# Patient Record
Sex: FEMALE | Race: OTHER | Employment: FULL TIME | URBAN - METROPOLITAN AREA
[De-identification: names, ages, dates, MRNs, and addresses within clinical notes are randomized per-mention and may not be internally consistent; named-entity substitution may affect disease eponyms.]

---

## 2020-07-31 PROBLEM — R10.13 DYSPEPSIA: Status: ACTIVE | Noted: 2020-07-31

## 2020-07-31 PROBLEM — K92.89 GAS BLOAT SYNDROME: Status: ACTIVE | Noted: 2020-07-31

## 2020-07-31 PROBLEM — K21.9 GASTROESOPHAGEAL REFLUX DISEASE: Status: ACTIVE | Noted: 2020-07-31

## 2020-08-05 ENCOUNTER — LAB ENCOUNTER (OUTPATIENT)
Dept: LAB | Facility: HOSPITAL | Age: 30
End: 2020-08-05
Attending: INTERNAL MEDICINE
Payer: COMMERCIAL

## 2020-08-05 DIAGNOSIS — Z01.818 PRE-OP TESTING: ICD-10-CM

## 2020-08-06 LAB — SARS-COV-2 RNA RESP QL NAA+PROBE: NOT DETECTED

## 2020-08-08 PROBLEM — R14.0 ABDOMINAL BLOATING: Status: ACTIVE | Noted: 2020-08-08

## 2021-03-25 PROBLEM — D21.9 FIBROIDS: Status: ACTIVE | Noted: 2021-03-25

## 2021-03-25 PROBLEM — Z98.890 HX OF BLEPHAROPLASTY: Status: ACTIVE | Noted: 2021-03-25

## 2022-12-22 ENCOUNTER — OFFICE VISIT (OUTPATIENT)
Dept: FAMILY MEDICINE CLINIC | Facility: CLINIC | Age: 32
End: 2022-12-22
Payer: COMMERCIAL

## 2022-12-22 VITALS
BODY MASS INDEX: 22.16 KG/M2 | RESPIRATION RATE: 18 BRPM | DIASTOLIC BLOOD PRESSURE: 70 MMHG | SYSTOLIC BLOOD PRESSURE: 100 MMHG | HEART RATE: 72 BPM | TEMPERATURE: 98 F | HEIGHT: 65 IN | WEIGHT: 133 LBS

## 2022-12-22 DIAGNOSIS — Z00.00 LABORATORY EXAM ORDERED AS PART OF ROUTINE GENERAL MEDICAL EXAMINATION: ICD-10-CM

## 2022-12-22 DIAGNOSIS — Z00.00 ANNUAL PHYSICAL EXAM: Primary | ICD-10-CM

## 2022-12-22 DIAGNOSIS — Z12.4 SCREENING FOR MALIGNANT NEOPLASM OF CERVIX: ICD-10-CM

## 2022-12-22 LAB
ALBUMIN SERPL-MCNC: 4 G/DL (ref 3.4–5)
ALBUMIN/GLOB SERPL: 1.1 {RATIO} (ref 1–2)
ALP LIVER SERPL-CCNC: 61 U/L
ALT SERPL-CCNC: 20 U/L
ANION GAP SERPL CALC-SCNC: 5 MMOL/L (ref 0–18)
AST SERPL-CCNC: 12 U/L (ref 15–37)
BASOPHILS # BLD AUTO: 0.03 X10(3) UL (ref 0–0.2)
BASOPHILS NFR BLD AUTO: 0.5 %
BILIRUB SERPL-MCNC: 1.1 MG/DL (ref 0.1–2)
BUN BLD-MCNC: 15 MG/DL (ref 7–18)
CALCIUM BLD-MCNC: 9.1 MG/DL (ref 8.5–10.1)
CHLORIDE SERPL-SCNC: 107 MMOL/L (ref 98–112)
CHOLEST SERPL-MCNC: 190 MG/DL (ref ?–200)
CO2 SERPL-SCNC: 26 MMOL/L (ref 21–32)
CREAT BLD-MCNC: 0.64 MG/DL
EOSINOPHIL # BLD AUTO: 0.07 X10(3) UL (ref 0–0.7)
EOSINOPHIL NFR BLD AUTO: 1.2 %
ERYTHROCYTE [DISTWIDTH] IN BLOOD BY AUTOMATED COUNT: 12.4 %
EST. AVERAGE GLUCOSE BLD GHB EST-MCNC: 111 MG/DL (ref 68–126)
FASTING PATIENT LIPID ANSWER: YES
FASTING STATUS PATIENT QL REPORTED: YES
GFR SERPLBLD BASED ON 1.73 SQ M-ARVRAT: 121 ML/MIN/1.73M2 (ref 60–?)
GLOBULIN PLAS-MCNC: 3.7 G/DL (ref 2.8–4.4)
GLUCOSE BLD-MCNC: 96 MG/DL (ref 70–99)
HBA1C MFR BLD: 5.5 % (ref ?–5.7)
HCT VFR BLD AUTO: 38 %
HDLC SERPL-MCNC: 104 MG/DL (ref 40–59)
HGB BLD-MCNC: 12.8 G/DL
IMM GRANULOCYTES # BLD AUTO: 0.02 X10(3) UL (ref 0–1)
IMM GRANULOCYTES NFR BLD: 0.3 %
LDLC SERPL CALC-MCNC: 69 MG/DL (ref ?–100)
LYMPHOCYTES # BLD AUTO: 2.06 X10(3) UL (ref 1–4)
LYMPHOCYTES NFR BLD AUTO: 35 %
MCH RBC QN AUTO: 32.7 PG (ref 26–34)
MCHC RBC AUTO-ENTMCNC: 33.7 G/DL (ref 31–37)
MCV RBC AUTO: 97.2 FL
MONOCYTES # BLD AUTO: 0.25 X10(3) UL (ref 0.1–1)
MONOCYTES NFR BLD AUTO: 4.2 %
NEUTROPHILS # BLD AUTO: 3.46 X10 (3) UL (ref 1.5–7.7)
NEUTROPHILS # BLD AUTO: 3.46 X10(3) UL (ref 1.5–7.7)
NEUTROPHILS NFR BLD AUTO: 58.8 %
NONHDLC SERPL-MCNC: 86 MG/DL (ref ?–130)
OSMOLALITY SERPL CALC.SUM OF ELEC: 287 MOSM/KG (ref 275–295)
PLATELET # BLD AUTO: 250 10(3)UL (ref 150–450)
POTASSIUM SERPL-SCNC: 4.3 MMOL/L (ref 3.5–5.1)
PROT SERPL-MCNC: 7.7 G/DL (ref 6.4–8.2)
RBC # BLD AUTO: 3.91 X10(6)UL
SODIUM SERPL-SCNC: 138 MMOL/L (ref 136–145)
TRIGL SERPL-MCNC: 101 MG/DL (ref 30–149)
TSI SER-ACNC: 1.31 MIU/ML (ref 0.36–3.74)
VIT D+METAB SERPL-MCNC: 19.6 NG/ML (ref 30–100)
VLDLC SERPL CALC-MCNC: 15 MG/DL (ref 0–30)
WBC # BLD AUTO: 5.9 X10(3) UL (ref 4–11)

## 2022-12-22 PROCEDURE — 83036 HEMOGLOBIN GLYCOSYLATED A1C: CPT | Performed by: STUDENT IN AN ORGANIZED HEALTH CARE EDUCATION/TRAINING PROGRAM

## 2022-12-22 PROCEDURE — 3008F BODY MASS INDEX DOCD: CPT | Performed by: STUDENT IN AN ORGANIZED HEALTH CARE EDUCATION/TRAINING PROGRAM

## 2022-12-22 PROCEDURE — 80061 LIPID PANEL: CPT | Performed by: STUDENT IN AN ORGANIZED HEALTH CARE EDUCATION/TRAINING PROGRAM

## 2022-12-22 PROCEDURE — 99385 PREV VISIT NEW AGE 18-39: CPT | Performed by: STUDENT IN AN ORGANIZED HEALTH CARE EDUCATION/TRAINING PROGRAM

## 2022-12-22 PROCEDURE — 3074F SYST BP LT 130 MM HG: CPT | Performed by: STUDENT IN AN ORGANIZED HEALTH CARE EDUCATION/TRAINING PROGRAM

## 2022-12-22 PROCEDURE — 80050 GENERAL HEALTH PANEL: CPT | Performed by: STUDENT IN AN ORGANIZED HEALTH CARE EDUCATION/TRAINING PROGRAM

## 2022-12-22 PROCEDURE — 3078F DIAST BP <80 MM HG: CPT | Performed by: STUDENT IN AN ORGANIZED HEALTH CARE EDUCATION/TRAINING PROGRAM

## 2022-12-22 PROCEDURE — 82306 VITAMIN D 25 HYDROXY: CPT | Performed by: STUDENT IN AN ORGANIZED HEALTH CARE EDUCATION/TRAINING PROGRAM

## 2022-12-22 PROCEDURE — 87624 HPV HI-RISK TYP POOLED RSLT: CPT | Performed by: STUDENT IN AN ORGANIZED HEALTH CARE EDUCATION/TRAINING PROGRAM

## 2022-12-22 RX ORDER — ACETAMINOPHEN 160 MG
2000 TABLET,DISINTEGRATING ORAL DAILY
COMMUNITY

## 2022-12-22 RX ORDER — CHOLECALCIFEROL (VITAMIN D3) 25 MCG
1 TABLET,CHEWABLE ORAL DAILY
COMMUNITY

## 2022-12-23 LAB — HPV I/H RISK 1 DNA SPEC QL NAA+PROBE: NEGATIVE

## 2023-03-25 ENCOUNTER — LAB ENCOUNTER (OUTPATIENT)
Dept: LAB | Age: 33
End: 2023-03-25
Attending: OBSTETRICS & GYNECOLOGY
Payer: COMMERCIAL

## 2023-03-25 DIAGNOSIS — E28.8 OTHER OVARIAN DYSFUNCTION: ICD-10-CM

## 2023-03-25 LAB — PROGEST SERPL-MCNC: 3.67 NG/ML

## 2023-03-25 PROCEDURE — 36415 COLL VENOUS BLD VENIPUNCTURE: CPT

## 2023-03-25 PROCEDURE — 84144 ASSAY OF PROGESTERONE: CPT

## 2023-03-25 PROCEDURE — 84146 ASSAY OF PROLACTIN: CPT | Performed by: OBSTETRICS & GYNECOLOGY

## 2023-04-15 ENCOUNTER — LAB ENCOUNTER (OUTPATIENT)
Dept: LAB | Age: 33
End: 2023-04-15
Attending: OBSTETRICS & GYNECOLOGY
Payer: COMMERCIAL

## 2023-04-15 DIAGNOSIS — E28.8 OTHER OVARIAN DYSFUNCTION: ICD-10-CM

## 2023-04-15 LAB — PROGEST SERPL-MCNC: 26.2 NG/ML

## 2023-04-15 PROCEDURE — 36415 COLL VENOUS BLD VENIPUNCTURE: CPT

## 2023-04-15 PROCEDURE — 84144 ASSAY OF PROGESTERONE: CPT

## 2023-05-24 PROCEDURE — 87491 CHLMYD TRACH DNA AMP PROBE: CPT | Performed by: OBSTETRICS & GYNECOLOGY

## 2023-05-24 PROCEDURE — 87591 N.GONORRHOEAE DNA AMP PROB: CPT | Performed by: OBSTETRICS & GYNECOLOGY

## 2023-05-24 PROCEDURE — 87086 URINE CULTURE/COLONY COUNT: CPT | Performed by: OBSTETRICS & GYNECOLOGY

## 2023-06-20 PROCEDURE — 87491 CHLMYD TRACH DNA AMP PROBE: CPT | Performed by: OBSTETRICS & GYNECOLOGY

## 2023-06-20 PROCEDURE — 87086 URINE CULTURE/COLONY COUNT: CPT | Performed by: OBSTETRICS & GYNECOLOGY

## 2023-06-20 PROCEDURE — 87591 N.GONORRHOEAE DNA AMP PROB: CPT | Performed by: OBSTETRICS & GYNECOLOGY

## 2023-06-27 ENCOUNTER — LAB ENCOUNTER (OUTPATIENT)
Dept: LAB | Age: 33
End: 2023-06-27
Attending: STUDENT IN AN ORGANIZED HEALTH CARE EDUCATION/TRAINING PROGRAM
Payer: COMMERCIAL

## 2023-06-27 DIAGNOSIS — Z36.9 FIRST TRIMESTER SCREENING: ICD-10-CM

## 2023-06-27 DIAGNOSIS — Z3A.12 12 WEEKS GESTATION OF PREGNANCY: ICD-10-CM

## 2023-06-27 DIAGNOSIS — Z34.81 ENCOUNTER FOR SUPERVISION OF OTHER NORMAL PREGNANCY IN FIRST TRIMESTER: ICD-10-CM

## 2023-06-27 DIAGNOSIS — Z3A.08 8 WEEKS GESTATION OF PREGNANCY: ICD-10-CM

## 2023-06-27 LAB
ANTIBODY SCREEN: NEGATIVE
BASOPHILS # BLD AUTO: 0.04 X10(3) UL (ref 0–0.2)
BASOPHILS NFR BLD AUTO: 0.7 %
EOSINOPHIL # BLD AUTO: 0.02 X10(3) UL (ref 0–0.7)
EOSINOPHIL NFR BLD AUTO: 0.4 %
ERYTHROCYTE [DISTWIDTH] IN BLOOD BY AUTOMATED COUNT: 11.9 %
HCT VFR BLD AUTO: 30.9 %
HGB BLD-MCNC: 11 G/DL
IMM GRANULOCYTES # BLD AUTO: 0.03 X10(3) UL (ref 0–1)
IMM GRANULOCYTES NFR BLD: 0.5 %
LYMPHOCYTES # BLD AUTO: 1.47 X10(3) UL (ref 1–4)
LYMPHOCYTES NFR BLD AUTO: 26.1 %
MCH RBC QN AUTO: 33.3 PG (ref 26–34)
MCHC RBC AUTO-ENTMCNC: 35.6 G/DL (ref 31–37)
MCV RBC AUTO: 93.6 FL
MONOCYTES # BLD AUTO: 0.24 X10(3) UL (ref 0.1–1)
MONOCYTES NFR BLD AUTO: 4.3 %
NEUTROPHILS # BLD AUTO: 3.83 X10 (3) UL (ref 1.5–7.7)
NEUTROPHILS # BLD AUTO: 3.83 X10(3) UL (ref 1.5–7.7)
NEUTROPHILS NFR BLD AUTO: 68 %
PLATELET # BLD AUTO: 233 10(3)UL (ref 150–450)
RBC # BLD AUTO: 3.3 X10(6)UL
RH BLOOD TYPE: POSITIVE
RUBV IGG SER QL: POSITIVE
RUBV IGG SER-ACNC: 12.2 IU/ML (ref 10–?)
WBC # BLD AUTO: 5.6 X10(3) UL (ref 4–11)

## 2023-06-27 PROCEDURE — 86803 HEPATITIS C AB TEST: CPT

## 2023-06-27 PROCEDURE — 86900 BLOOD TYPING SEROLOGIC ABO: CPT

## 2023-06-27 PROCEDURE — 86850 RBC ANTIBODY SCREEN: CPT

## 2023-06-27 PROCEDURE — 86780 TREPONEMA PALLIDUM: CPT

## 2023-06-27 PROCEDURE — 85025 COMPLETE CBC W/AUTO DIFF WBC: CPT

## 2023-06-27 PROCEDURE — 87389 HIV-1 AG W/HIV-1&-2 AB AG IA: CPT

## 2023-06-27 PROCEDURE — 87340 HEPATITIS B SURFACE AG IA: CPT

## 2023-06-27 PROCEDURE — 86762 RUBELLA ANTIBODY: CPT

## 2023-06-27 PROCEDURE — 86901 BLOOD TYPING SEROLOGIC RH(D): CPT

## 2023-06-28 LAB
HBV SURFACE AG SER-ACNC: <0.1 [IU]/L
HBV SURFACE AG SERPL QL IA: NONREACTIVE
HCV AB SERPL QL IA: NONREACTIVE
T PALLIDUM AB SER QL IA: NONREACTIVE

## 2023-07-19 ENCOUNTER — LAB ENCOUNTER (OUTPATIENT)
Dept: LAB | Age: 33
End: 2023-07-19
Payer: COMMERCIAL

## 2023-07-19 DIAGNOSIS — Z34.02 ENCOUNTER FOR SUPERVISION OF NORMAL FIRST PREGNANCY IN SECOND TRIMESTER: ICD-10-CM

## 2023-07-19 PROCEDURE — 82105 ALPHA-FETOPROTEIN SERUM: CPT

## 2023-07-19 PROCEDURE — 36415 COLL VENOUS BLD VENIPUNCTURE: CPT

## 2023-07-22 LAB
AFP MOM: 1.84
AFP VALUE: 68.6 NG/ML
GA ON COLL DATE: 16.4 WEEKS
INSULIN DEP AFP: NO
MAT AGE AT EDD: 32.5 YR
MULTIPLE GEST AFP: NO
OSBR RISK 1 IN AFP: 1162
WEIGHT AFP: 144 LBS

## 2023-08-04 ENCOUNTER — TELEPHONE (OUTPATIENT)
Dept: FAMILY MEDICINE CLINIC | Facility: CLINIC | Age: 33
End: 2023-08-04

## 2023-09-30 ENCOUNTER — LAB ENCOUNTER (OUTPATIENT)
Dept: LAB | Age: 33
End: 2023-09-30
Attending: OBSTETRICS & GYNECOLOGY
Payer: COMMERCIAL

## 2023-09-30 DIAGNOSIS — Z34.02 ENCOUNTER FOR SUPERVISION OF NORMAL FIRST PREGNANCY IN SECOND TRIMESTER: ICD-10-CM

## 2023-09-30 LAB
BASOPHILS # BLD AUTO: 0.04 X10(3) UL (ref 0–0.2)
BASOPHILS NFR BLD AUTO: 0.5 %
DEPRECATED HBV CORE AB SER IA-ACNC: 7.3 NG/ML
EOSINOPHIL # BLD AUTO: 0.03 X10(3) UL (ref 0–0.7)
EOSINOPHIL NFR BLD AUTO: 0.4 %
ERYTHROCYTE [DISTWIDTH] IN BLOOD BY AUTOMATED COUNT: 12.6 %
GLUCOSE 1H P GLC SERPL-MCNC: 160 MG/DL
HCT VFR BLD AUTO: 29.3 %
HGB BLD-MCNC: 10.3 G/DL
IMM GRANULOCYTES # BLD AUTO: 0.05 X10(3) UL (ref 0–1)
IMM GRANULOCYTES NFR BLD: 0.6 %
LYMPHOCYTES # BLD AUTO: 1.62 X10(3) UL (ref 1–4)
LYMPHOCYTES NFR BLD AUTO: 19.5 %
MCH RBC QN AUTO: 33 PG (ref 26–34)
MCHC RBC AUTO-ENTMCNC: 35.2 G/DL (ref 31–37)
MCV RBC AUTO: 93.9 FL
MONOCYTES # BLD AUTO: 0.27 X10(3) UL (ref 0.1–1)
MONOCYTES NFR BLD AUTO: 3.3 %
NEUTROPHILS # BLD AUTO: 6.28 X10 (3) UL (ref 1.5–7.7)
NEUTROPHILS # BLD AUTO: 6.28 X10(3) UL (ref 1.5–7.7)
NEUTROPHILS NFR BLD AUTO: 75.7 %
PLATELET # BLD AUTO: 224 10(3)UL (ref 150–450)
RBC # BLD AUTO: 3.12 X10(6)UL
WBC # BLD AUTO: 8.3 X10(3) UL (ref 4–11)

## 2023-09-30 PROCEDURE — 82728 ASSAY OF FERRITIN: CPT

## 2023-09-30 PROCEDURE — 36415 COLL VENOUS BLD VENIPUNCTURE: CPT

## 2023-09-30 PROCEDURE — 82950 GLUCOSE TEST: CPT

## 2023-09-30 PROCEDURE — 85025 COMPLETE CBC W/AUTO DIFF WBC: CPT

## 2023-10-17 PROBLEM — D50.9 IRON DEFICIENCY ANEMIA, UNSPECIFIED: Status: ACTIVE | Noted: 2023-10-17

## 2023-10-17 PROBLEM — Z34.90 PREGNANCY: Status: ACTIVE | Noted: 2023-10-17

## 2023-10-17 PROBLEM — Z34.90 PREGNANCY (HCC): Status: ACTIVE | Noted: 2023-10-17

## 2023-10-18 ENCOUNTER — TELEPHONE (OUTPATIENT)
Dept: HEMATOLOGY/ONCOLOGY | Facility: HOSPITAL | Age: 33
End: 2023-10-18

## 2023-10-18 NOTE — TELEPHONE ENCOUNTER
Received call back from Hira at [facility] office. Dr. Pamela Heart to clarify interval for Venofer orders- will call back today.

## 2023-10-18 NOTE — TELEPHONE ENCOUNTER
Received call back from [facility]. Venofer doses should be once per week. Order entered for MD to co-sign. Pt has been contacted to schedule first dose.

## 2023-10-21 ENCOUNTER — LAB ENCOUNTER (OUTPATIENT)
Dept: LAB | Age: 33
End: 2023-10-21
Attending: OBSTETRICS & GYNECOLOGY

## 2023-10-21 DIAGNOSIS — Z34.03 ENCOUNTER FOR SUPERVISION OF NORMAL FIRST PREGNANCY IN THIRD TRIMESTER: ICD-10-CM

## 2023-10-21 DIAGNOSIS — O99.810 ABNORMAL MATERNAL GLUCOSE TOLERANCE, ANTEPARTUM: ICD-10-CM

## 2023-10-21 LAB
GLUCOSE 1H P GLC SERPL-MCNC: 143 MG/DL
GLUCOSE 2H P GLC SERPL-MCNC: 101 MG/DL
GLUCOSE 3H P GLC SERPL-MCNC: 63 MG/DL (ref 70–140)
GLUCOSE P FAST SERPL-MCNC: 91 MG/DL

## 2023-10-21 PROCEDURE — 82952 GTT-ADDED SAMPLES: CPT

## 2023-10-21 PROCEDURE — 36415 COLL VENOUS BLD VENIPUNCTURE: CPT

## 2023-10-21 PROCEDURE — 82951 GLUCOSE TOLERANCE TEST (GTT): CPT

## 2023-10-21 PROCEDURE — 87389 HIV-1 AG W/HIV-1&-2 AB AG IA: CPT

## 2023-10-27 ENCOUNTER — OFFICE VISIT (OUTPATIENT)
Dept: HEMATOLOGY/ONCOLOGY | Facility: HOSPITAL | Age: 33
End: 2023-10-27
Attending: OBSTETRICS & GYNECOLOGY

## 2023-10-27 VITALS
HEART RATE: 99 BPM | OXYGEN SATURATION: 97 % | SYSTOLIC BLOOD PRESSURE: 110 MMHG | DIASTOLIC BLOOD PRESSURE: 70 MMHG | TEMPERATURE: 98 F | RESPIRATION RATE: 16 BRPM

## 2023-10-27 DIAGNOSIS — D50.9 IRON DEFICIENCY ANEMIA, UNSPECIFIED: Primary | ICD-10-CM

## 2023-10-27 DIAGNOSIS — Z34.90 PREGNANCY: ICD-10-CM

## 2023-10-27 PROCEDURE — 96374 THER/PROPH/DIAG INJ IV PUSH: CPT

## 2023-10-27 RX ORDER — DIPHENHYDRAMINE HYDROCHLORIDE 50 MG/ML
25 INJECTION INTRAMUSCULAR; INTRAVENOUS ONCE
OUTPATIENT
Start: 2023-11-03

## 2023-10-27 RX ORDER — MEPERIDINE HYDROCHLORIDE 25 MG/ML
25 INJECTION INTRAMUSCULAR; INTRAVENOUS; SUBCUTANEOUS ONCE
OUTPATIENT
Start: 2023-11-03

## 2023-10-27 RX ORDER — FAMOTIDINE 10 MG/ML
20 INJECTION, SOLUTION INTRAVENOUS ONCE
OUTPATIENT
Start: 2023-11-03

## 2023-10-27 RX ORDER — METHYLPREDNISOLONE SODIUM SUCCINATE 125 MG/2ML
125 INJECTION, POWDER, LYOPHILIZED, FOR SOLUTION INTRAMUSCULAR; INTRAVENOUS ONCE
OUTPATIENT
Start: 2023-11-03

## 2023-10-27 RX ORDER — METHYLPREDNISOLONE SODIUM SUCCINATE 40 MG/ML
40 INJECTION, POWDER, LYOPHILIZED, FOR SOLUTION INTRAMUSCULAR; INTRAVENOUS ONCE
OUTPATIENT
Start: 2023-11-03

## 2023-10-27 NOTE — PROGRESS NOTES
Education Record    Learner:  Patient    Disease / Diagnosis:iv venofer    Barriers / Limitations:  None   Comments:    Method:  Discussion   Comments:    General Topics:  Medication and Plan of care reviewed   Comments:    Outcome:  Shows understanding   Comments:tolerated venofer. Observed for 30 minutes and vital signs recorded. Next appointment requested.

## 2023-11-02 ENCOUNTER — APPOINTMENT (OUTPATIENT)
Dept: HEMATOLOGY/ONCOLOGY | Age: 33
End: 2023-11-02
Attending: OBSTETRICS & GYNECOLOGY
Payer: COMMERCIAL

## 2023-11-09 ENCOUNTER — OFFICE VISIT (OUTPATIENT)
Dept: HEMATOLOGY/ONCOLOGY | Facility: HOSPITAL | Age: 33
End: 2023-11-09
Attending: OBSTETRICS & GYNECOLOGY
Payer: COMMERCIAL

## 2023-11-09 VITALS
SYSTOLIC BLOOD PRESSURE: 101 MMHG | OXYGEN SATURATION: 96 % | TEMPERATURE: 97 F | BODY MASS INDEX: 25.93 KG/M2 | HEIGHT: 65 IN | DIASTOLIC BLOOD PRESSURE: 64 MMHG | HEART RATE: 96 BPM | WEIGHT: 155.63 LBS

## 2023-11-09 DIAGNOSIS — D50.9 IRON DEFICIENCY ANEMIA, UNSPECIFIED: Primary | ICD-10-CM

## 2023-11-09 DIAGNOSIS — Z34.90 PREGNANCY: ICD-10-CM

## 2023-11-09 PROCEDURE — 96374 THER/PROPH/DIAG INJ IV PUSH: CPT

## 2023-11-09 RX ORDER — FAMOTIDINE 10 MG/ML
20 INJECTION, SOLUTION INTRAVENOUS ONCE
OUTPATIENT
Start: 2023-11-09

## 2023-11-09 RX ORDER — DIPHENHYDRAMINE HYDROCHLORIDE 50 MG/ML
25 INJECTION INTRAMUSCULAR; INTRAVENOUS ONCE
OUTPATIENT
Start: 2023-11-09

## 2023-11-09 RX ORDER — METHYLPREDNISOLONE SODIUM SUCCINATE 125 MG/2ML
125 INJECTION, POWDER, LYOPHILIZED, FOR SOLUTION INTRAMUSCULAR; INTRAVENOUS ONCE
OUTPATIENT
Start: 2023-11-09

## 2023-11-09 RX ORDER — METHYLPREDNISOLONE SODIUM SUCCINATE 40 MG/ML
40 INJECTION, POWDER, LYOPHILIZED, FOR SOLUTION INTRAMUSCULAR; INTRAVENOUS ONCE
OUTPATIENT
Start: 2023-11-09

## 2023-11-09 RX ORDER — MEPERIDINE HYDROCHLORIDE 25 MG/ML
25 INJECTION INTRAMUSCULAR; INTRAVENOUS; SUBCUTANEOUS ONCE
OUTPATIENT
Start: 2023-11-09

## 2023-11-09 NOTE — PROGRESS NOTES
Pt here for venofer infusion . Pt denies any issues or concerns. Ordering MD: David  Order Exp: end of order     Pt tolerated infusion without difficulty or complaint.  Reviewed next apt date/time:       Education Record  Learner:  Patient  Disease / Diagnosis: BARB in pregnancy  Barriers / Limitations:  None  Method:  Brief focused  General Topics:  Plan of care reviewed  Outcome:  Shows understanding      Vitals obtained 30 minutes after infusion

## 2023-12-07 PROCEDURE — 87081 CULTURE SCREEN ONLY: CPT | Performed by: OBSTETRICS & GYNECOLOGY

## 2023-12-07 PROCEDURE — 87150 DNA/RNA AMPLIFIED PROBE: CPT | Performed by: OBSTETRICS & GYNECOLOGY

## 2023-12-25 ENCOUNTER — HOSPITAL ENCOUNTER (INPATIENT)
Facility: HOSPITAL | Age: 33
LOS: 2 days | Discharge: HOME | End: 2023-12-27
Attending: OBSTETRICS & GYNECOLOGY | Admitting: OBSTETRICS & GYNECOLOGY
Payer: COMMERCIAL

## 2023-12-25 ENCOUNTER — ANESTHESIA EVENT (OUTPATIENT)
Dept: OBGYN UNIT | Facility: HOSPITAL | Age: 33
End: 2023-12-25
Payer: COMMERCIAL

## 2023-12-25 ENCOUNTER — ANESTHESIA (OUTPATIENT)
Dept: OBGYN UNIT | Facility: HOSPITAL | Age: 33
End: 2023-12-25
Payer: COMMERCIAL

## 2023-12-25 LAB
ANTIBODY SCREEN: NEGATIVE
BASOPHILS # BLD AUTO: 0.06 X10(3) UL (ref 0–0.2)
BASOPHILS NFR BLD AUTO: 0.5 %
EOSINOPHIL # BLD AUTO: 0.01 X10(3) UL (ref 0–0.7)
EOSINOPHIL NFR BLD AUTO: 0.1 %
ERYTHROCYTE [DISTWIDTH] IN BLOOD BY AUTOMATED COUNT: 14.3 %
HCT VFR BLD AUTO: 32 %
HGB BLD-MCNC: 11.3 G/DL
IMM GRANULOCYTES # BLD AUTO: 0.23 X10(3) UL (ref 0–1)
IMM GRANULOCYTES NFR BLD: 1.8 %
LYMPHOCYTES # BLD AUTO: 1.71 X10(3) UL (ref 1–4)
LYMPHOCYTES NFR BLD AUTO: 13.3 %
MCH RBC QN AUTO: 32.9 PG (ref 26–34)
MCHC RBC AUTO-ENTMCNC: 35.3 G/DL (ref 31–37)
MCV RBC AUTO: 93.3 FL
MONOCYTES # BLD AUTO: 0.62 X10(3) UL (ref 0.1–1)
MONOCYTES NFR BLD AUTO: 4.8 %
NEUTROPHILS # BLD AUTO: 10.2 X10 (3) UL (ref 1.5–7.7)
NEUTROPHILS # BLD AUTO: 10.2 X10(3) UL (ref 1.5–7.7)
NEUTROPHILS NFR BLD AUTO: 79.5 %
PLATELET # BLD AUTO: 164 10(3)UL (ref 150–450)
RBC # BLD AUTO: 3.43 X10(6)UL
RH BLOOD TYPE: POSITIVE
T PALLIDUM AB SER QL IA: NONREACTIVE
WBC # BLD AUTO: 12.8 X10(3) UL (ref 4–11)

## 2023-12-25 PROCEDURE — 0DQR0ZZ REPAIR ANAL SPHINCTER, OPEN APPROACH: ICD-10-PCS | Performed by: OBSTETRICS & GYNECOLOGY

## 2023-12-25 PROCEDURE — 85025 COMPLETE CBC W/AUTO DIFF WBC: CPT | Performed by: OBSTETRICS & GYNECOLOGY

## 2023-12-25 PROCEDURE — 86900 BLOOD TYPING SEROLOGIC ABO: CPT | Performed by: OBSTETRICS & GYNECOLOGY

## 2023-12-25 PROCEDURE — 86850 RBC ANTIBODY SCREEN: CPT | Performed by: OBSTETRICS & GYNECOLOGY

## 2023-12-25 PROCEDURE — 86780 TREPONEMA PALLIDUM: CPT | Performed by: OBSTETRICS & GYNECOLOGY

## 2023-12-25 PROCEDURE — 99214 OFFICE O/P EST MOD 30 MIN: CPT

## 2023-12-25 PROCEDURE — 86901 BLOOD TYPING SEROLOGIC RH(D): CPT | Performed by: OBSTETRICS & GYNECOLOGY

## 2023-12-25 RX ORDER — BISACODYL 10 MG
10 SUPPOSITORY, RECTAL RECTAL ONCE AS NEEDED
Status: DISCONTINUED | OUTPATIENT
Start: 2023-12-25 | End: 2023-12-27

## 2023-12-25 RX ORDER — CITRIC ACID/SODIUM CITRATE 334-500MG
30 SOLUTION, ORAL ORAL AS NEEDED
Status: DISCONTINUED | OUTPATIENT
Start: 2023-12-25 | End: 2023-12-25 | Stop reason: HOSPADM

## 2023-12-25 RX ORDER — TERBUTALINE SULFATE 1 MG/ML
0.25 INJECTION, SOLUTION SUBCUTANEOUS AS NEEDED
Status: DISCONTINUED | OUTPATIENT
Start: 2023-12-25 | End: 2023-12-25 | Stop reason: HOSPADM

## 2023-12-25 RX ORDER — NALBUPHINE HYDROCHLORIDE 10 MG/ML
2.5 INJECTION, SOLUTION INTRAMUSCULAR; INTRAVENOUS; SUBCUTANEOUS
Status: DISCONTINUED | OUTPATIENT
Start: 2023-12-25 | End: 2023-12-26

## 2023-12-25 RX ORDER — DOCUSATE SODIUM 100 MG/1
100 CAPSULE, LIQUID FILLED ORAL
Status: DISCONTINUED | OUTPATIENT
Start: 2023-12-25 | End: 2023-12-27

## 2023-12-25 RX ORDER — IBUPROFEN 600 MG/1
600 TABLET ORAL ONCE AS NEEDED
Status: DISCONTINUED | OUTPATIENT
Start: 2023-12-25 | End: 2023-12-25 | Stop reason: HOSPADM

## 2023-12-25 RX ORDER — METHYLERGONOVINE MALEATE 0.2 MG/ML
INJECTION INTRAVENOUS
Status: COMPLETED
Start: 2023-12-25 | End: 2023-12-25

## 2023-12-25 RX ORDER — DEXTROSE, SODIUM CHLORIDE, SODIUM LACTATE, POTASSIUM CHLORIDE, AND CALCIUM CHLORIDE 5; .6; .31; .03; .02 G/100ML; G/100ML; G/100ML; G/100ML; G/100ML
INJECTION, SOLUTION INTRAVENOUS AS NEEDED
Status: DISCONTINUED | OUTPATIENT
Start: 2023-12-25 | End: 2023-12-25 | Stop reason: HOSPADM

## 2023-12-25 RX ORDER — ACETAMINOPHEN 500 MG
1000 TABLET ORAL EVERY 6 HOURS PRN
Status: DISCONTINUED | OUTPATIENT
Start: 2023-12-25 | End: 2023-12-27

## 2023-12-25 RX ORDER — SODIUM CHLORIDE, SODIUM LACTATE, POTASSIUM CHLORIDE, CALCIUM CHLORIDE 600; 310; 30; 20 MG/100ML; MG/100ML; MG/100ML; MG/100ML
INJECTION, SOLUTION INTRAVENOUS CONTINUOUS
Status: DISCONTINUED | OUTPATIENT
Start: 2023-12-25 | End: 2023-12-25 | Stop reason: HOSPADM

## 2023-12-25 RX ORDER — LIDOCAINE HYDROCHLORIDE AND EPINEPHRINE 15; 5 MG/ML; UG/ML
INJECTION, SOLUTION EPIDURAL AS NEEDED
Status: DISCONTINUED | OUTPATIENT
Start: 2023-12-25 | End: 2023-12-25 | Stop reason: SURG

## 2023-12-25 RX ORDER — ACETAMINOPHEN 500 MG
500 TABLET ORAL EVERY 6 HOURS PRN
Status: DISCONTINUED | OUTPATIENT
Start: 2023-12-25 | End: 2023-12-27

## 2023-12-25 RX ORDER — BUPIVACAINE HCL/0.9 % NACL/PF 0.25 %
5 PLASTIC BAG, INJECTION (ML) EPIDURAL AS NEEDED
Status: DISCONTINUED | OUTPATIENT
Start: 2023-12-25 | End: 2023-12-26

## 2023-12-25 RX ORDER — ACETAMINOPHEN 500 MG
500 TABLET ORAL EVERY 6 HOURS PRN
Status: DISCONTINUED | OUTPATIENT
Start: 2023-12-25 | End: 2023-12-25

## 2023-12-25 RX ORDER — ONDANSETRON 2 MG/ML
4 INJECTION INTRAMUSCULAR; INTRAVENOUS EVERY 6 HOURS PRN
Status: DISCONTINUED | OUTPATIENT
Start: 2023-12-25 | End: 2023-12-25 | Stop reason: HOSPADM

## 2023-12-25 RX ORDER — SIMETHICONE 80 MG
80 TABLET,CHEWABLE ORAL 3 TIMES DAILY PRN
Status: DISCONTINUED | OUTPATIENT
Start: 2023-12-25 | End: 2023-12-27

## 2023-12-25 RX ORDER — IBUPROFEN 600 MG/1
600 TABLET ORAL EVERY 6 HOURS
Status: DISCONTINUED | OUTPATIENT
Start: 2023-12-25 | End: 2023-12-27

## 2023-12-25 RX ORDER — ACETAMINOPHEN 500 MG
1000 TABLET ORAL EVERY 6 HOURS PRN
Status: DISCONTINUED | OUTPATIENT
Start: 2023-12-25 | End: 2023-12-25

## 2023-12-25 RX ADMIN — LIDOCAINE HYDROCHLORIDE AND EPINEPHRINE 3 ML: 15; 5 INJECTION, SOLUTION EPIDURAL at 10:39:00

## 2023-12-25 RX ADMIN — LIDOCAINE HYDROCHLORIDE AND EPINEPHRINE 2 ML: 15; 5 INJECTION, SOLUTION EPIDURAL at 10:40:00

## 2023-12-25 NOTE — ANESTHESIA PROCEDURE NOTES
Labor Analgesia    Date/Time: 12/25/2023 10:29 AM    Performed by: Elver Meraz MD  Authorized by: Elver Meraz MD      General Information and Staff    Start Time:  12/25/2023 10:29 AM  End Time:  12/25/2023 10:39 AM  Anesthesiologist:  Elver Meraz MD  Performed by:   Anesthesiologist  Patient Location:  OB  Site Identification: surface landmarks    Reason for Block: labor epidural    Preanesthetic Checklist: patient identified, IV checked, risks and benefits discussed, monitors and equipment checked, pre-op evaluation, timeout performed, IV bolus, anesthesia consent and sterile technique used      Procedure Details    Patient Position:  Sitting  Prep: ChloraPrep    Monitoring:  Heart rate and continuous pulse ox  Approach:  Midline    Epidural Needle    Injection Technique:  MARYANN air  Needle Type:  Tuohy  Needle Gauge:  17 G  Needle Length:  3.375 in  Needle Insertion Depth:  6  Location:  L4-5    Spinal Needle      Catheter    Catheter Type:  End hole  Catheter Size:  19 G  Catheter at Skin Depth:  12  Test Dose:  Negative    Assessment      Additional Comments     Test Dose Given at 1039 am  Fentanyl 2 mc/ml + Ropivicaine 0.15% epidural infusion 12cc/hr  Fentanyl 50 mcg/mL 100 mcg

## 2023-12-25 NOTE — PLAN OF CARE
Problem: BIRTH - VAGINAL/ SECTION  Goal: Fetal and maternal status remain reassuring during the birth process  Description: INTERVENTIONS:  - Monitor vital signs  - Monitor fetal heart rate  - Monitor uterine activity  - Monitor labor progression (vaginal delivery)  - DVT prophylaxis (C/S delivery)  - Surgical antibiotic prophylaxis (C/S delivery)  Outcome: Progressing     Problem: PAIN - ADULT  Goal: Verbalizes/displays adequate comfort level or patient's stated pain goal  Description: INTERVENTIONS:  - Encourage pt to monitor pain and request assistance  - Assess pain using appropriate pain scale  - Administer analgesics based on type and severity of pain and evaluate response  - Implement non-pharmacological measures as appropriate and evaluate response  - Consider cultural and social influences on pain and pain management  - Manage/alleviate anxiety  - Utilize distraction and/or relaxation techniques  - Monitor for opioid side effects  - Notify MD/LIP if interventions unsuccessful or patient reports new pain  - Anticipate increased pain with activity and pre-medicate as appropriate  Outcome: Progressing     Problem: ANXIETY  Goal: Will report anxiety at manageable levels  Description: INTERVENTIONS:  - Administer medication as ordered  - Teach and rehearse alternative coping skills  - Provide emotional support with 1:1 interaction with staff  Outcome: Progressing     Problem: Patient/Family Goals  Goal: Patient/Family Long Term Goal  Description: Patient's Long Term Goal: Safe and uncomplicated delivery    Interventions:  - See additional Care Plan goals for specific interventions  Outcome: Progressing     Problem: Patient/Family Goals  Goal: Patient/Family Short Term Goal  Description: Patient's Short Term Goal: Adequate pain control    Interventions:   - See additional Care Plan goals for specific interventions  Outcome: Progressing

## 2023-12-25 NOTE — PROGRESS NOTES
Pt is a 29year old female admitted to 104/104-A,     Pt is 39w1d intra-uterine pregnancy. Patient states UC's began to get stronger and more intense at 0100. Denies any leaking of fluid. Reports +fetal movement. History obtained, consents signed. Oriented to room, staff, and plan of care.

## 2023-12-26 LAB
BASOPHILS # BLD AUTO: 0.04 X10(3) UL (ref 0–0.2)
BASOPHILS NFR BLD AUTO: 0.3 %
DEPRECATED HBV CORE AB SER IA-ACNC: 26.6 NG/ML
EOSINOPHIL # BLD AUTO: 0.03 X10(3) UL (ref 0–0.7)
EOSINOPHIL NFR BLD AUTO: 0.2 %
ERYTHROCYTE [DISTWIDTH] IN BLOOD BY AUTOMATED COUNT: 14.5 %
HCT VFR BLD AUTO: 28.3 %
HGB BLD-MCNC: 9.5 G/DL
IMM GRANULOCYTES # BLD AUTO: 0.11 X10(3) UL (ref 0–1)
IMM GRANULOCYTES NFR BLD: 0.7 %
LYMPHOCYTES # BLD AUTO: 1.83 X10(3) UL (ref 1–4)
LYMPHOCYTES NFR BLD AUTO: 11.5 %
MCH RBC QN AUTO: 31.8 PG (ref 26–34)
MCHC RBC AUTO-ENTMCNC: 33.6 G/DL (ref 31–37)
MCV RBC AUTO: 94.6 FL
MONOCYTES # BLD AUTO: 0.92 X10(3) UL (ref 0.1–1)
MONOCYTES NFR BLD AUTO: 5.8 %
NEUTROPHILS # BLD AUTO: 12.94 X10 (3) UL (ref 1.5–7.7)
NEUTROPHILS # BLD AUTO: 12.94 X10(3) UL (ref 1.5–7.7)
NEUTROPHILS NFR BLD AUTO: 81.5 %
PLATELET # BLD AUTO: 127 10(3)UL (ref 150–450)
RBC # BLD AUTO: 2.99 X10(6)UL
WBC # BLD AUTO: 15.9 X10(3) UL (ref 4–11)

## 2023-12-26 PROCEDURE — 85025 COMPLETE CBC W/AUTO DIFF WBC: CPT | Performed by: OBSTETRICS & GYNECOLOGY

## 2023-12-26 PROCEDURE — 82728 ASSAY OF FERRITIN: CPT | Performed by: OBSTETRICS & GYNECOLOGY

## 2023-12-26 RX ORDER — CHOLECALCIFEROL (VITAMIN D3) 25 MCG
1 TABLET,CHEWABLE ORAL DAILY
Status: DISCONTINUED | OUTPATIENT
Start: 2023-12-26 | End: 2023-12-27

## 2023-12-26 NOTE — PROGRESS NOTES
Patient up to bathroom with assist x 2. Voided 500 mL/U. Patient transferred to mother/baby room 2206 per wheelchair in stable condition with baby and personal belongings. Accompanied by significant other and staff. Bands matched with Both parents and RN. Report given to mother/baby RN Dwight Kim.

## 2023-12-26 NOTE — L&D DELIVERY NOTE
Nahomi Marshall [[id number]]      Labor Events     labor?: No   steroids?: None  Antibiotics received during labor?: No  Rupture date/time: 2023 1109     Rupture type: SROM  Fluid color: Clear  Labor type: Spontaneous Onset of Labor  Augmentation: None  Intrapartum & labor complications: None       Labor Event Times    Labor onset date/time: 2023 0100  Dilation complete date/time: 2023  Start pushing date/time: 2023 1933       [place] Presentation    Presentation: Vertex  Position: Right Occiput Anterior       Operative Delivery    Operative Vaginal Delivery: No                      Shoulder Dystocia    Shoulder Dystocia: No             Anesthesia    Method: Epidural              [place] Delivery      Delivery date/time:  23 20:50:00   Delivery type: Normal spontaneous vaginal delivery    Details:     Delivery location: delivery room       Delivery Providers    Delivering Clinician: Cedric Gonzalez MD   Delivery personnel:  Provider Role   Althea Cedeño, RN Baby Nurse   Binh Ko, RN Delivery Nurse             Cord    Vessels: 3 Vessels  Complications: None  Timed cord clamping: Yes  Gases sent?: No       Resuscitation    Method: None       [place] Measurements      Weight: 4090 g 9 lb 0.3 oz Length: 54.6 cm     Head circum.: 33 cm Chest circum. : 34 cm          Abdominal circum.: 32 cm           Placenta    Date/time: 2023  Removal: Spontaneous  Appearance: Intact  Disposition: held for future pathology       Apgars    Living status: Living   Apgar Scoring Key:    0 1 2    Skin color Blue or pale Acrocyanotic Completely pink    Heart rate Absent <100 bpm >100 bpm    Reflex irritability No response Grimace Cry or active withdrawal    Muscle tone Limp Some flexion Active motion    Respiratory effort Absent Weak cry; hypoventilation Good, crying              1 Minute:  5 Minute:  10 Minute:  15 Minute:  20 Minute:      Skin color: 0  1 Heart rate: 2  2       Reflex irritablity: 2  2       Muscle tone: 2  2       Respiratory effort: 2  2       Total: 8  9          Apgars assigned by: Kota Schulte RN  Elsah disposition: with mother       Skin to Skin    Skin to skin with: Mother       Vaginal Count    Initial count RN: Petr Terrell RN  Initial count Tech: Marsha Jeffers    Initial counts 11   0    Final counts        Final count RN: Krish Camarillo RN  Final count MD: Karime Joseph MD       Delivery (Maternal)    Episiotomy: None  Perineal lacerations: 3rd Repaired?: Yes     Vaginal laceration?: No      Cervical laceration?: No    Clitoral laceration?: No                                                                          Vaginal Delivery Note          Narciso Loving Patient Status:  Inpatient    1990 MRN [id number]   Location [address] [place] [address] Attending Oma Nichols MD   Hosp Day # 0 PCP Noelle Overton MD       Delivery: Normal spontaneous vaginal delivery  Surgeon: Evan Cardozo  Anesthesia: Epidural  EBL: 600 cc  Infant: male  Apgars: 8/9  Weight: 9#  Procedure: The patients was placed in the dorsalithotomy position and prepped. She was encouraged to push. As the head was delivered the legs were lowered and the perineum was supported to decrease the risk of tearing. The shoulders and body were delivered atraumatically with momentum. The infant was dried and suctioned. The cord was doubly clamped and cut at 1 min. The cord blood was sampled. IV pitocin and gentle uterine massage helped delivery of the placenta. IM methergine given for atony. The 3rd degree perineal laceration was repaired in layers. The vaginal portion was approximated with a 2.0 rapide vicryl and a crown stitch was made. The perineum was approximated with the same suture. The skin was approximated with 3.0 rapide vicryl.   A rectal-vaginal exam was normal and bleeding was minimal.  The patient was then moved to the supine position in stable condition. Counts were correct.     Nuchal cord: no  Cord Blood Banking: no  Episiotomy: no  Complications:  None      Nisreen Louise MD  12/25/2023  9:08 PM

## 2023-12-27 VITALS
OXYGEN SATURATION: 96 % | WEIGHT: 166 LBS | HEIGHT: 65 IN | BODY MASS INDEX: 27.66 KG/M2 | SYSTOLIC BLOOD PRESSURE: 101 MMHG | HEART RATE: 78 BPM | DIASTOLIC BLOOD PRESSURE: 76 MMHG | RESPIRATION RATE: 16 BRPM | TEMPERATURE: 98 F

## 2023-12-27 RX ORDER — IBUPROFEN 600 MG/1
600 TABLET ORAL EVERY 6 HOURS PRN
Qty: 30 TABLET | Refills: 1 | Status: SHIPPED | OUTPATIENT
Start: 2023-12-27

## 2023-12-27 NOTE — PROGRESS NOTES
DISCHARGE NOTE    Patient discharged to home. Discharge instructions were given. All questions answered.

## 2023-12-27 NOTE — DISCHARGE SUMMARY
[facility]  Discharge Summary    Sofiya Anne Patient Status:  Inpatient    1990 MRN [id number]   [facility] 2SW-J Attending Jalen Lion MD   Hosp Day # 2 PCP Felicia Mccarty MD     Admit Date:  2023    EDC: Estimated Date of Delivery: 23    Gestational Age: 36w3d    Antepartum complications: See Prenatal Record    Date of Delivery: See Delivery Summary    Delivered By:  See Delivery Summary    Delivery Type: spontaneous vaginal delivery       Intrapartum Complications: See Delivery Summary    Episiotomy / lacerations: See Delivery Summary      Rh Immune Globulin Given:  See Prenatal Record and nursing notes    Rubella Vaccine Given: See Prenatal Record and nursing notes    Activity:  Routine post partum and post operative instructions if  section    Medications:  Motrin alternating with Tylenol,     Diet:  General    Discharge Date: 2023          Plan:       Follow-up appointment in 6  weeks  Routine post partum instructions    German Hoffman MD  2023  10:40 AM

## 2023-12-27 NOTE — PLAN OF CARE
Problem: POSTPARTUM  Goal: Long Term Goal:Experiences normal postpartum course  Description: INTERVENTIONS:  - Assess and monitor vital signs and lab values. - Assess fundus and lochia. - Provide ice/sitz baths for perineum discomfort. - Monitor healing of incision/episiotomy/laceration, and assess for signs and symptoms of infection and hematoma. - Assess bladder function and monitor for bladder distention.  - Provide/instruct/assist with pericare as needed. - Provide VTE prophylaxis as needed. - Monitor bowel function.  - Encourage ambulation and provide assistance as needed. - Assess and monitor emotional status and provide social service/psych resources as needed. - Utilize standard precautions and use personal protective equipment as indicated. Ensure aseptic care of all intravenous lines and invasive tubes/drains.  - Obtain immunization and exposure to communicable diseases history. 12/27/2023 1041 by Emily Mccain RN  Outcome: Completed  12/27/2023 0803 by Emily Mccain RN  Outcome: Progressing  Goal: Optimize infant feeding at the breast  Description: INTERVENTIONS:  - Initiate breast feeding within first hour after birth. - Monitor effectiveness of current breast feeding efforts. - Assess support systems available to mother/family.  - Identify cultural beliefs/practices regarding lactation, letdown techniques, maternal food preferences. - Assess mother's knowledge and previous experience with breast feeding.  - Provide information as needed about early infant feeding cues (e.g., rooting, lip smacking, sucking fingers/hand) versus late cue of crying.  - Discuss/demonstrate breast feeding aids (e.g., infant sling, nursing footstool/pillows, and breast pumps). - Encourage mother/other family members to express feelings/concerns, and actively listen. - Educate father/SO about benefits of breast feeding and how to manage common lactation challenges.   - Recommend avoidance of specific medications or substances incompatible with breast feeding.  - Assess and monitor for signs of nipple pain/trauma. - Instruct and provide assistance with proper latch. - Review techniques for milk expression (breast pumping) and storage of breast milk. Provide pumping equipment/supplies, instructions and assistance, as needed. - Encourage rooming-in and breast feeding on demand.  - Encourage skin-to-skin contact. - Provide  support as needed. - Assess for and manage engorgement. - Provide breast feeding education handouts and information on community breast feeding support. 2023 104 by Kavitha Mora RN  Outcome: Completed  2023 by Kavitha Mora RN  Outcome: Progressing  Goal: Appropriate maternal -  bonding  Description: INTERVENTIONS:  - Assess caregiver- interactions. - Assess caregiver's emotional status and coping mechanisms. - Encourage caregiver to participate in  daily care. - Assess support systems available to mother/family.  - Provide /case management support as needed.   2023 104 by Kavitha Mora RN  Outcome: Completed  2023 by Kavitha Mora RN  Outcome: Progressing

## 2023-12-30 ENCOUNTER — TELEPHONE (OUTPATIENT)
Dept: OBGYN UNIT | Facility: HOSPITAL | Age: 33
End: 2023-12-30

## 2024-08-19 ENCOUNTER — APPOINTMENT (OUTPATIENT)
Dept: CT IMAGING | Age: 34
End: 2024-08-19
Attending: EMERGENCY MEDICINE
Payer: COMMERCIAL

## 2024-08-19 ENCOUNTER — HOSPITAL ENCOUNTER (OUTPATIENT)
Age: 34
Discharge: HOME | End: 2024-08-19
Attending: EMERGENCY MEDICINE
Payer: COMMERCIAL

## 2024-08-19 VITALS
OXYGEN SATURATION: 100 % | HEIGHT: 64 IN | HEART RATE: 69 BPM | RESPIRATION RATE: 16 BRPM | SYSTOLIC BLOOD PRESSURE: 101 MMHG | DIASTOLIC BLOOD PRESSURE: 70 MMHG | WEIGHT: 136 LBS | TEMPERATURE: 99 F | BODY MASS INDEX: 23.22 KG/M2

## 2024-08-19 DIAGNOSIS — R55 SYNCOPE, VASOVAGAL: Primary | ICD-10-CM

## 2024-08-19 LAB
#MXD IC: 0.2 X10ˆ3/UL (ref 0.1–1)
ATRIAL RATE: 66 BPM
BUN BLD-MCNC: 19 MG/DL (ref 7–18)
CHLORIDE BLD-SCNC: 105 MMOL/L (ref 98–112)
CO2 BLD-SCNC: 24 MMOL/L (ref 21–32)
CREAT BLD-MCNC: 0.6 MG/DL
EGFRCR SERPLBLD CKD-EPI 2021: 121 ML/MIN/1.73M2 (ref 60–?)
GLUCOSE BLD-MCNC: 97 MG/DL (ref 70–99)
HCT VFR BLD AUTO: 37.9 %
HCT VFR BLD CALC: 37 %
HGB BLD-MCNC: 12.4 G/DL
ISTAT IONIZED CALCIUM FOR CHEM 8: 1.17 MMOL/L (ref 1.12–1.32)
LYMPHOCYTES # BLD AUTO: 1.7 X10ˆ3/UL (ref 1–4)
LYMPHOCYTES NFR BLD AUTO: 33.2 %
MCH RBC QN AUTO: 30.2 PG (ref 26–34)
MCHC RBC AUTO-ENTMCNC: 32.7 G/DL (ref 31–37)
MCV RBC AUTO: 92.2 FL (ref 80–100)
MIXED CELL %: 3.8 %
NEUTROPHILS # BLD AUTO: 3.1 X10ˆ3/UL (ref 1.5–7.7)
NEUTROPHILS NFR BLD AUTO: 63 %
P AXIS: 49 DEGREES
P-R INTERVAL: 136 MS
PLATELET # BLD AUTO: 249 X10ˆ3/UL (ref 150–450)
POTASSIUM BLD-SCNC: 4 MMOL/L (ref 3.6–5.1)
Q-T INTERVAL: 396 MS
QRS DURATION: 74 MS
QTC CALCULATION (BEZET): 415 MS
R AXIS: 50 DEGREES
RBC # BLD AUTO: 4.11 X10ˆ6/UL
SODIUM BLD-SCNC: 141 MMOL/L (ref 136–145)
T AXIS: 26 DEGREES
VENTRICULAR RATE: 66 BPM
WBC # BLD AUTO: 5 X10ˆ3/UL (ref 4–11)

## 2024-08-19 PROCEDURE — 70450 CT HEAD/BRAIN W/O DYE: CPT | Performed by: EMERGENCY MEDICINE

## 2024-08-19 PROCEDURE — 93010 ELECTROCARDIOGRAM REPORT: CPT

## 2024-08-19 PROCEDURE — 99205 OFFICE O/P NEW HI 60 MIN: CPT

## 2024-08-19 PROCEDURE — 85025 COMPLETE CBC W/AUTO DIFF WBC: CPT | Performed by: EMERGENCY MEDICINE

## 2024-08-19 PROCEDURE — 36415 COLL VENOUS BLD VENIPUNCTURE: CPT

## 2024-08-19 PROCEDURE — 93005 ELECTROCARDIOGRAM TRACING: CPT

## 2024-08-19 PROCEDURE — 80047 BASIC METABLC PNL IONIZED CA: CPT

## 2024-08-19 PROCEDURE — 99215 OFFICE O/P EST HI 40 MIN: CPT

## 2024-08-19 NOTE — ED INITIAL ASSESSMENT (HPI)
Patient reports she took 2 gulps of her 's energy drink, and then had a pain to her abdomen.  Patient reports pain felt like a cramp.   states patient arched her back and appeared to have a seizure.

## 2024-08-21 NOTE — ED PROVIDER NOTES
Patient Seen in: [facility]      History   No chief complaint on file.    Stated Complaint: Seizure, Head/Neck Injury    Subjective:   HPI    33 yo female was getting ready for work this morning and two two large gulps of her husbands energy drink. She developed sudden, severe pain in her abdomen and suddenly felt lightheaded. She laid down but then sat up. Her  then reports that she fell backwards and was unresponsive for a few seconds and was shaking. She awoke quickly and was somewhat dazed. In IC all symptoms have resolved and she has no complaints.     Objective:   Past Medical History:    Bloating    Decorative tattoo    Flatulence/gas pain/belching    Food intolerance              History reviewed. No pertinent surgical history.             Social History     Socioeconomic History    Marital status: Single   Tobacco Use    Smoking status: Never     Passive exposure: Never    Smokeless tobacco: Never   Substance and Sexual Activity    Alcohol use: Yes     Comment: [facility]    Drug use: Never    Sexual activity: Yes     Partners: Male     Social Determinants of Health     Financial Resource Strain: Low Risk  (12/25/2023)    Financial Resource Strain     Difficulty of Paying Living Expenses: Not very hard     Med Affordability: No   Food Insecurity: No Food Insecurity (12/25/2023)    Food Insecurity     Food Insecurity: Never true   Transportation Needs: No Transportation Needs (12/25/2023)    Transportation Needs     Lack of Transportation: No   Stress: No Stress Concern Present (12/25/2023)    Stress     Feeling of Stress : No   Housing Stability: Low Risk  (12/25/2023)    Housing Stability     Housing Instability: No              Review of Systems    Positive for stated Chief Complaint: No chief complaint on file.    Other systems are as noted in HPI.  Constitutional and vital signs reviewed.      All other systems reviewed and negative except as noted above.    Physical Exam     ED  Chief Complaint   Patient presents with   • Pain     top of right foot pain, thinks he hit is a while back, wants x-rays       SUBJECTIVE  Presents with self in room.    Ab is a 58 year old male who presents with a lump to his dorsal right foot which he noticed it 3 days.  He feels it could have been there longer as he recalls feeling a smaller bump in same spot while putting on his socks. This bump does not hurt.  He wonders if this is injury as he bumps his foot a lot on ladders. He feels it is a rough job. 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  It does not hurt him but he wants to rule out injury.      Previous injury to this area: none  Things tried for relief:  Nothing taken for relief    Bruising: no  Swelling: yes localized  Paresethesia:  no  Abrasions: no    Denies pain in right foot/ ankle    ROS:    Diabetes:  no  Neuro:   negative   Fevers:  negative     PMH:  None    Alg:  ALLERGIES:  Patient has no known allergies.    Meds:  None    Social Hx:  nonsmoker    OBJECTIVE:  Visit Vitals  /80   Temp 98 °F (36.7 °C) (Tympanic)   Resp 18     Gen: alert and oriented, Nontoxic in appearance and no distress  Lungs: clear to auscultation bilaterally  CV:    regular rate rhythm, no murmurs    **right foot EXAM--  Exam of area of main complaint is dorsal foot. On the dorsal mid foot there is a nickel size dome type circular smooth mobile lesion--likely a ganglionic cyst-- located at mid 2nd/3rd MC.   There is no redness to skin; lesion is not tender;  No open wounds; no warmth;  Full ROM of toes with no pains.  No bruising.   Neurovascular intact at site and distally at pt's baseline.     Right ankle EXAM:   no  swelling noted. no  pain with palpation in area.  Full ROM.    No calf pain or swelling; negative christopher sign ; Achilles intact.         Workup done in [facility]--  Xray of  RIGHT FOOT : no fracture   Nonspecific soft tissue swelling along the dorsal aspect of the midfoot.   If further evaluation is  Triage Vitals [08/19/24 0910]   /70   Pulse 69   Resp 16   Temp 99.1 °F (37.3 °C)   Temp src Oral   SpO2 100 %   O2 Device None (Room air)       Current Vitals:   No data recorded        Physical Exam  Vitals and nursing note reviewed.   Constitutional:       Appearance: Normal appearance. She is well-developed.   HENT:      Head: Normocephalic and atraumatic.      Mouth/Throat:      Mouth: Mucous membranes are moist.      Pharynx: Oropharynx is clear.   Eyes:      Extraocular Movements: Extraocular movements intact.      Pupils: Pupils are equal, round, and reactive to light.   Cardiovascular:      Rate and Rhythm: Normal rate and regular rhythm.      Heart sounds: Normal heart sounds.   Pulmonary:      Effort: Pulmonary effort is normal. No respiratory distress.      Breath sounds: Normal breath sounds.   Abdominal:      General: There is no distension.      Palpations: Abdomen is soft.      Tenderness: There is no abdominal tenderness.   Musculoskeletal:      Cervical back: Normal range of motion and neck supple. No tenderness.   Skin:     General: Skin is warm and dry.      Capillary Refill: Capillary refill takes less than 2 seconds.   Neurological:      General: No focal deficit present.      Mental Status: She is alert.      Cranial Nerves: No cranial nerve deficit.      Sensory: No sensory deficit.      Motor: No weakness.   Psychiatric:         Mood and Affect: Mood normal.         Behavior: Behavior normal.              ED Course     Labs Reviewed   POCT ISTAT CHEM8 CARTRIDGE - Abnormal; Notable for the following components:       Result Value    ISTAT BUN 19 (*)     All other components within normal limits   POCT CBC     EKG    Rate, intervals and axes as noted on EKG Report.  Rate: 66  Rhythm: Sinus Rhythm  Reading: Normal                           MDM                                      Medical Decision Making    Vasovagal syncope, seizure both in differential. History most consistent with  clinically warranted, MRI examination could be  considered.  Osteoarthritis and other findings as described above.    ASSESSMENT:  Soft tissue lump to dorsal right foot--- possible ganglionic cyst     PLAN:   ---discussed this lesion ---appears cystic but further evaluation warranted through podiatry for opinion and if needs further workup such as MRI etc.    -Can use OTC tylenol  if tolerated for mild/ moderate pain.     --do not irritate. If onset of redness at site then concerns of infection.   Then should seek medical care.    --SEE PODIATRY FOR FURTHER EVALUATION OF THIS LESION/ IMAGING ETC..    Questions addressed.  Patient  expresses understanding of treatment plan and follow up instructions.      Reji Guerrier,   03/20/21             vasovagal episode. Patient normal exam in IC and has no complaints. EKG reviewed by myself and is normal. CT brain images reviewed by myself. No acute intracranial bleed. CBC and Chem both normal.   Home to follow up with primary care.   Disposition and Plan     Clinical Impression:  1. Syncope, vasovagal         Disposition:  Discharge  8/19/2024 10:20 am    Follow-up:  James Torres MD  [address] [address]  [phone]      As needed          Medications Prescribed:  Discharge Medication List as of 8/19/2024 10:28 AM

## 2025-02-08 ENCOUNTER — OFFICE VISIT (OUTPATIENT)
Facility: CLINIC | Age: 35
End: 2025-02-08
Payer: COMMERCIAL

## 2025-02-08 VITALS
WEIGHT: 133 LBS | HEIGHT: 65 IN | SYSTOLIC BLOOD PRESSURE: 92 MMHG | BODY MASS INDEX: 22.16 KG/M2 | DIASTOLIC BLOOD PRESSURE: 64 MMHG

## 2025-02-08 DIAGNOSIS — Z31.69 ENCOUNTER FOR PRECONCEPTION CONSULTATION: ICD-10-CM

## 2025-02-08 DIAGNOSIS — Z12.4 CERVICAL CANCER SCREENING: Primary | ICD-10-CM

## 2025-02-08 DIAGNOSIS — Z00.00 ANNUAL PHYSICAL EXAM: ICD-10-CM

## 2025-02-08 DIAGNOSIS — E28.8 OTHER OVARIAN DYSFUNCTION: ICD-10-CM

## 2025-02-08 DIAGNOSIS — N39.3 SUI (STRESS URINARY INCONTINENCE, FEMALE): ICD-10-CM

## 2025-02-08 LAB
BILIRUBIN: NEGATIVE
GLUCOSE (URINE DIPSTICK): NEGATIVE MG/DL
KETONES (URINE DIPSTICK): NEGATIVE MG/DL
LEUKOCYTES: NEGATIVE
NITRITE, URINE: NEGATIVE
OCCULT BLOOD: NEGATIVE
PH, URINE: 6 (ref 4.5–8)
SPECIFIC GRAVITY: 1.02 (ref 1–1.03)
UROBILINOGEN,SEMI-QN: 0.2 MG/DL (ref 0–1.9)

## 2025-02-08 PROCEDURE — 99385 PREV VISIT NEW AGE 18-39: CPT | Performed by: OBSTETRICS & GYNECOLOGY

## 2025-02-08 PROCEDURE — 3008F BODY MASS INDEX DOCD: CPT | Performed by: OBSTETRICS & GYNECOLOGY

## 2025-02-08 PROCEDURE — 88175 CYTOPATH C/V AUTO FLUID REDO: CPT | Performed by: OBSTETRICS & GYNECOLOGY

## 2025-02-08 PROCEDURE — 87624 HPV HI-RISK TYP POOLED RSLT: CPT | Performed by: OBSTETRICS & GYNECOLOGY

## 2025-02-08 PROCEDURE — 81003 URINALYSIS AUTO W/O SCOPE: CPT | Performed by: OBSTETRICS & GYNECOLOGY

## 2025-02-08 PROCEDURE — 3074F SYST BP LT 130 MM HG: CPT | Performed by: OBSTETRICS & GYNECOLOGY

## 2025-02-08 PROCEDURE — 3078F DIAST BP <80 MM HG: CPT | Performed by: OBSTETRICS & GYNECOLOGY

## 2025-02-08 RX ORDER — LETROZOLE 2.5 MG/1
2.5 TABLET, FILM COATED ORAL DAILY
Qty: 15 TABLET | Refills: 1 | Status: SHIPPED | OUTPATIENT
Start: 2025-02-08

## 2025-02-08 NOTE — PROGRESS NOTES
GYN H&P     Genetic questionnaire reviewed with the patient and she will be referred for genetic counseling if the questionnaire had any positive results.    The [facility] intake form was also reviewed regarding contraception, menstrual periods, urinary health, and vaginal / sexual health    2025  8:33 AM    Chief Complaint   Patient presents with    Physical     Would like to talk about pelvic therapy due to leaking when coughing and sneezing.       HPI: Umu is a 34 year old  Patient's last menstrual period was 2025.  (contraception: none) here for her annual gyn exam.     She has JOANIE  complaints.   Menses are regular. Denies any pelvic or breast complaints.   Satisfied with current contraception.     Doesn't wear pad everyday - leaks rarely with stress    Previous encounters and chart reviewed.     OB History    Para Term  AB Living   1 1 1 0 0 1   SAB IAB Ectopic Multiple Live Births   0 0 0 0 1      # Outcome Date GA Lbr Mj/2nd Weight Sex Type Anes PTL Lv   1 Term 23 39w1d 18:15 / 01:35 9 lb 0.3 oz (4.09 kg) M NORMAL SPONT EPI N BEA      Complications: Meconium       GYN hx:   Menarche: 14  Period Cycle (Days): 28  Period Duration (Days): 6  Period Flow: heavy days 1-4  Use of Birth Control (if yes, specify type): None  Pap Date: 22  Pap Result Notes: NEG/NEG,  and neg per Pt.  Follow Up Recommendation:       Past Medical History:    Bloating    Decorative tattoo    Flatulence/gas pain/belching    Food intolerance     No past surgical history on file.  Allergies[1]  Medications Ordered Prior to Encounter[2]  Family History   Problem Relation Age of Onset    Diabetes Maternal Grandmother     Diabetes Maternal Grandfather     Diabetes Paternal Grandmother     Diabetes Paternal Grandfather      Social History     Socioeconomic History    Marital status: Single     Spouse name: Not on file    Number of children: Not on file    Years of education:  Not on file    Highest education level: Not on file   Occupational History    Not on file   Tobacco Use    Smoking status: Never     Passive exposure: Never    Smokeless tobacco: Never   Vaping Use    Vaping status: Not on file   Substance and Sexual Activity    Alcohol use: Yes     Comment: [facility]    Drug use: Never    Sexual activity: Yes     Partners: Male   Other Topics Concern    Not on file   Social History Narrative    Not on file     Social Drivers of Health     Food Insecurity: No Food Insecurity (12/25/2023)    Food Insecurity     Food Insecurity: Never true   Transportation Needs: No Transportation Needs (12/25/2023)    Transportation Needs     Lack of Transportation: No   Stress: No Stress Concern Present (12/25/2023)    Stress     Feeling of Stress : No   Housing Stability: Low Risk  (12/25/2023)    Housing Stability     Housing Instability: No     Housing Instability Emergency: Not on file     Crib or Bassinette: Not on file       ROS:     Review of Systems:  General: denies fevers, chills, fatigue and malaise.   Eyes: no visual changes, denies headaches  ENT: no complaints, denies earaches, runny nose, epistaxis, throat pain or sore throat  Respiratory: denies SOB, dyspnea, cough or wheezing  Cardiovascular: denies chest pain, palpitations, exercise intolerance   GI: denies abdominal pain, diarrhea, constipation  : SEE HPI, denies dysuria, increased urinary frequency. Menses regular, no dysmenorrhea, no menorrhagia, no dyspareunia   Hematological/lymphatic: denies history of excessive bleeding or bruising, denies dizziness, lightheadedness.   Breast: denies rashes, skin changes, pain, lumps or discharge   Psychiatric: denies depression, changes in sleep patterns, anxiety  Endocrine: denies hot or cold intolerance, mood changes   Neurological: denies changes in sight, smell, hearing or taste. Denies seizures or tremors  Immunological: denies allergies, denies anaphylaxis, or swollen lymph  nodes  Musculoskeletal: denies joint pain, morning stiffness, decreased range of motion         O BP 92/64   Ht 65\"   Wt 133 lb (60.3 kg)   LMP 01/16/2025   Breastfeeding Yes   BMI 22.13 kg/m²         Wt Readings from Last 6 Encounters:   02/08/25 133 lb (60.3 kg)   08/19/24 136 lb (61.7 kg)   02/06/24 142 lb (64.4 kg)   12/25/23 166 lb (75.3 kg)   12/20/23 166 lb (75.3 kg)   12/14/23 167 lb (75.8 kg)     Exam:   GENERAL: well developed, well nourished, in no apparent distress, oriented.  SKIN: no rashes, no suspicious lesions  HEENT: normal  NECK: supple; no thyromegaly, no adenopathy  LUNGS: clear to auscultation  CARDIOVASCULAR: normal S1, S2, RRR  BREASTS: FCsoft, nontender, no palpable masses or nodes, no nipple discharge, no skin changes, no axillary adenopathy  ABDOMEN: no scars,  soft, non distended; non tender, no masses  PELVIC: External Genitalia: Normal appearing, no lesions.    Vagina: normal pink mucosa, no lesions, normal clear discharge.    Bladder well supported.  Urethral hypermobility only,     Cervix: multiparous, no lesions , No CMT     Uterus: AVAF, mobile, non tender, normal size    Adnexa: non tender, no masses, normal size    Rectal: deferred  EXTREMITIES:  non tender without edema        A/P: Patient is 34 year old female with no complaints. Here for well woman exam.            Patient counseled on:    Diet/exercise.      Self Breast Exams     Safe sex practices / and living environment     Vaccines:  Annual Flu, Tdap +/- Gardasil  recommended (up to 45 yrs).      Pneumococcal at 65 yrs old, Shingles at 60 yrs old          Pap: done  GC/Chlamydia:  na  Mammogram:  na   Dexa:  na  Colonoscopy / Cologuard:   na  Lipid / Cholesterol:    Lipid Panel [E] [[id number]] (Abnormal)  Resulted: 12/22/22 1134, Result status: Final result  Resulting lab: [facility] ([facility])     Specimen Information    ID Type Source Collected On   [id number] Blood -- 12/22/22 0841      Components    Component Value Reference Range Flag   Cholesterol, Total 190 <200 mg/dL --   Comment: Desirable  <200 mg/dL  Borderline  200-239 mg/dL  High      >=240 mg/dL     HDL Cholesterol 104 40 - 59 mg/dL H High    Comment:      Interpretive Information:  An HDL cholesterol <40 mg/dL is low and constitutes a coronary heart disease risk factor. An HDL cholesterol >60 mg/dL is a negative risk factor for coronary heart disease.     Triglycerides 101 30 - 149 mg/dL --   Comment:      Reference interval for fasting triglycerides  Desirable: <150 mg/dL  Borderline: 150-199 mg/dL  High: 200-499 mg/dL  Very High: >=500 mg/dL       LDL Cholesterol 69 <100 mg/dL --   Comment: Desirable <100 mg/dL  Borderline 100-129 mg/dL  High     >=130mg/dL     VLDL 15 0 - 30 mg/dL --   Non HDL Chol 86 <130 mg/dL --   Comment:      Desirable  <130 mg/dL  Borderline  130-159 mg/dL  High        160-189 mg/dL      Very high >=190 mg/dL            Meds This Visit:    Requested Prescriptions     Signed Prescriptions Disp Refills    letrozole 2.5 MG Oral Tab 15 tablet 1     Sig: Take 1 tablet (2.5 mg total) by mouth daily. Days 3 - 7 of every other month       1. Cervical cancer screening  - ThinPrep PAP Smear; Future  - Hpv High Risk , Thin Prep Collection; Future    2. Annual physical exam    3. Encounter for preconception consultation    4. Lactating mother (HCC)    5. JOANIE (stress urinary incontinence, female)  - Pelvic Floor Therapy - [place] [facility]    6. Other ovarian dysfunction  - letrozole 2.5 MG Oral Tab; Take 1 tablet (2.5 mg total) by mouth daily. Days 3 - 7 of every other month  Dispense: 15 tablet; Refill: 1  - Progesterone; Future  AFter lactation finished      Wants to try and conceive again and needed Letrozole in the past - STILL BREASTFEEDING - counseled    Need to stop - Letrozole q O mth  Luteal phase progesterones  Pelvic floor PT for JOANIE, UA      Return in about 6 months (around 8/8/2025) for Well Woman  Exam.    Jass Hunt MD   2/8/2025  8:33 AM       This note was created by freee voice recognition. Errors in content may be related to improper recognition by the system; efforts to review and correct have been done but errors may still exist. Please contact me with any questions.    Note to patient and family   The 21st Century Cures Act makes medical notes available to patients in the interest of transparency.  However, please be advised that this is a medical document.  It is intended as [id number]-[id number] communication.  It is written in medical language which may contain abbreviations or verbiage that are technical and unfamiliar.  It may appear blunt or direct.  Medical documents are intended to carry relevant information, facts as evident, and the clinical opinion of the practitioner.           [1] No Known Allergies  [2]   Current Outpatient Medications on File Prior to Visit   Medication Sig Dispense Refill    prenatal vitamin with DHA 27-0.8-228 MG Oral Cap Take 1 capsule by mouth daily. (Patient not taking: Reported on 2/8/2025)       No current facility-administered medications on file prior to visit.

## 2025-02-10 LAB — HPV E6+E7 MRNA CVX QL NAA+PROBE: NEGATIVE

## 2025-02-19 ENCOUNTER — TELEPHONE (OUTPATIENT)
Dept: PHYSICAL THERAPY | Facility: HOSPITAL | Age: 35
End: 2025-02-19

## 2025-03-11 ENCOUNTER — TELEPHONE (OUTPATIENT)
Dept: PHYSICAL THERAPY | Facility: HOSPITAL | Age: 35
End: 2025-03-11

## 2025-03-12 ENCOUNTER — OFFICE VISIT (OUTPATIENT)
Dept: PHYSICAL THERAPY | Age: 35
End: 2025-03-12
Attending: OBSTETRICS & GYNECOLOGY
Payer: COMMERCIAL

## 2025-03-12 DIAGNOSIS — N39.3 SUI (STRESS URINARY INCONTINENCE, FEMALE): Primary | ICD-10-CM

## 2025-03-12 PROCEDURE — 97162 PT EVAL MOD COMPLEX 30 MIN: CPT | Performed by: PHYSICAL THERAPIST

## 2025-03-12 PROCEDURE — 97535 SELF CARE MNGMENT TRAINING: CPT | Performed by: PHYSICAL THERAPIST

## 2025-03-12 NOTE — PROGRESS NOTES
MUSCULOSKELETAL AND PELVIC FLOOR EVALUATION:     Diagnosis:   JOANIE (stress urinary incontinence, female) (N39.3) Patient:  Umu Stuart (34 year old, female)        Referring Provider: Jass Hunt  Today's Date   3/12/2025    Precautions:  None   Date of Evaluation: 25  Next MD visit: No data recorded  Date of Surgery: n/a     PATIENT SUMMARY   Summary of chief complaints: JOANIE with cough/ sneeze, UUI, LBP & tightness, abdominal weakness  History of current condition: symptoms after birth of 1st child (boy) on 23; baby was 9#; pt had perineal lac gr 3   Pain level: no pelvic pain other than with intercourse  Description of symptoms: BM 2x/ day up to every 2 days; BSS 4; 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); nocturia 1 only if later fluid intake; water 32-48 oz/ day  Occupation:    Prior level of function: minimal JOANIE prior to pregnancy, increased since delivery  Current limitations: urinary leakage with cough/ sneeze, urinary leakage with full bladder/ urge, dyspareunia with initial & deep penetration, constipation, straining with defecation, fecal smearing, decreased void frequencies, 1x nocturia  Pt goals: \"not to leak\"  Pregnant Now: No (pt is aware to inform PT if she obtains +pregnancy test; pt is open to second child/ another pregnancy)   OB History    Para Term  AB Living   1 1 1 0 0 1   SAB IAB Ectopic Multiple Live Births   0 0 0 0 1      # Outcome Date GA Lbr Jm/2nd Weight Sex Type Anes PTL Lv   1 Term 23 39w1d 18:15 / 01:35 9 lb 0.3 oz M NORMAL SPONT EPI N BEA      Complications: Meconium      Name: Chris Stuart      Apgar1: 8  Apgar5: 9     PFDI 20    Scores   POPDI 6:    25   CRAD 8:    46.88   FRANCESCA 6:    54.17   Summary:    126.05      Outpatient Therapy Pelvic Health Intake       Question 3/11/2025 11:29 PM CDT - Filed by Patient    Do you usually experience pressure in the lower  abdomen? Not at all    Do you usually experience heaviness or dullness in the pelvic area? Not at all    Do you usually have a bulge or something falling out that you can see or feel in your vaginal area? Not at all    Do you ever have to push on the vagina or around the rectum to have or complete a bowel movement? Not at all    Do you usually experience a feeling of incomplete bladder emptying? Not at all    Do you ever have to push up on a bulge in the vaginal area with your fingers to start or complete urination? Not at all    Please provide details on the following urinary history / complaints     Frequency of urination: # of times/day 4    Frequency of urination: # of times/night 3    When you have the urge to urinate, how long can you delay before you have to go to the toilet? (# of minutes or hours) It depends but after having my baby in can’t hold it like I use too . I gsve to goo right away and end up peeing a little.    Do you have a history of urinary tract infections: No    Do you have a history of urine loss (select all that apply)       How many times a day does leakage occur? 1    If you have leakage, what type(s) of protective device(s) do you use? (Select all that apply)       On average, how many pads do you use each day?       Do you soak the pad fully? No    Do you change the pad each time it is wet? No    Do you experience any of these symptoms? (Select all that apply)       Do you usually experience frequent urination? Somewhat    Do you usually experience urine leakage associated with a feeling of urgency, that is, a strong sensation of needing to go to the bathroom? Moderately    Do you usually experience urine leakage related to coughing, sneezing, or laughing? Moderately    Do you usually experience small amounts of urine leakage (that is, drops)? Moderately    Do you usually experience difficulty empyting your bladder? Not at all    Do you usually experience pain or discomfort in the lower  abdomen or genital region? Not at all    Have you ever had any of the following treatment:     Have you ever done exercises to control urine loss? If so, for how long? No    Has your doctor ever prescribed any medication for urine loss? No    Have you had any surgical procedures to treat urine loss? No    Please provide details on the following bowel history / complaints.     How many bowel movements do you have per day? 1    How many bowel movements do you have per night? Between 0-1 it dependa what I eat    Do you experience diarrhea? If yes, how often?       Do you feel you need to strain too hard to have a bowel movement? Moderately    Do you feel you have not completely emptied your bowels at the end of a bowel movement? Somewhat    Do you usually lose stool beyond your control if your stool is well formed? Not at all    Do you usually lose stool beyond your control if your stool is loose? Not at all    Do you usually lose gas from the rectum beyond your control? Moderately    Do you usually have pain when you pass your stool? Somewhat    Do you experience strong sense of urgency and have to rush to bathroom for bowel movement? Not at all    Does part of bowel ever pass through rectum and bulge outside during/after bowel movement? Somewhat    Please provide details on your daily fluid/food intake.     On average, what is your daily fluid intake (1 glass=8ounces)? (# of glasses per day) 4    How many are caffeinated? (# of glasses per day) 0    Do you restrict fluids because of incontenence (leakage)? No    Do you include fiber in your diet (fruits, vegetables, bran, etc.)? Yes    Psychosocial information     Do you live alone? No    Which recreational activities do you participate in if any?       Have you had to restrict your activities due to your pelvic health concerns? No    On a scale of 0 (no impairments) to 10 (severe impairments), what are your feelings about your urinary or bowel incontinence or pelvic  pain? 8    Do you have pain with intercourse? Yes    Have you had any changes in intimate relationships/sexual function due to your symptoms?  Yes    Your personal safety is of utmost importance to us at [facility], please answer the following questions:     Are you being hurt, frightened, demeaned, or taken advantage of by anyone at your home or in your life?  No    Have you recently had thoughts of hurting yourself? No    Have you tried to hurt yourself in the past?  No    Pelvic Organ Prolapse Distress Inventory 6 Score (range: 0 - 100) 25    Colorectal-Anal Distress Inventory 8 Score (range: 0 - 100) 46.88    Urinary Distress Inventory 6 Score (range: 0 - 100) 54.17    PFDI Summary Score (range: 0 - 300) 126.05            Sexual Health Status  Marinoff Scale: 1   Sexual intercourse status: active, pain with both initial & deep penetration, position dependent     Past medical history was reviewed with Umu.  Umu  has a past medical history of Bloating, Decorative tattoo, Flatulence/gas pain/belching, and Food intolerance.  She  has no past surgical history on file.    ASSESSMENT  Umu presents to physical therapy evaluation with primary c/o JOANIE with cough/ sneeze, UUI, LBP & tightness, abdominal weakness. The results of the objective tests and measures show internal PFM exam impairments of: absent involuntary contraction & involuntary relaxation, mod restriction & TTP to perineal scar, PERF 2/4/5/6 indicating decreased power/ endurance/ reps/ quick contractions of PFM contraction, min ant & post wall tissue laxity, min restriction & varied TTP to layer 1-2 PFM L & min to mod restriction & varied TTP to layer 3 PFM L (see chart below for specific musculature); with additional impairment to be identified subsequent visit upon orthopedic musculoskeletal examination as appropriate. Functional deficits include but are not limited to urinary leakage with cough/ sneeze, urinary leakage with full  bladder/ urge, dyspareunia with initial & deep penetration, constipation, straining with defecation, fecal smearing, decreased void frequencies, 1x nocturia. Signs and symptoms are consistent with diagnosis of JOANIE (stress urinary incontinence, female) (N39.3). Pt and PT discussed evaluation findings, pathology, POC and HEP.  Pt voiced understanding and performs HEP correctly without reported pain. Skilled Physical Therapy is medically necessary to address the above impairments and reach functional goals.    OBJECTIVE:    Musculoskeletal: TBD subsequent visit    Informed consent for internal pelvic evaluation given:Yes (pt is aware to inform PT if obtains +pregnancy test throughout [place])     External Observation:   Voluntary contraction: present   Voluntary relaxation: present   Involuntary contraction: absent   Involuntary relaxation: absent   Mons pubis: WNL   Labia majora: WNL  Labia minora: WNL   Urethral meatus: WNL   Introitus: WNL   Perineal body: WNL    Internal Examination   Scar: TTP, mod restriction    Pelvic Floor Muscle strength: (PERF= Power/Endurance/Reps/Fast) MMT:  Power Endurance REPS Fast   2 4 5 6     Accessory Muscle Use: gluteals; adductors     Tissue Laxity Test:  Anterior Wall: min  Posterior Wall: min   Apical: WNL      Internal Palpation:    Superficial Transverse Perineal  minimal restriction (L)   Deep Transverse Perineal minimal restriction (L)   Compress Urethra/Sphincter minimal restriction; tenderness (L)   Urethrovaginalis minimal restriction; tenderness (L)   Pubococcygeus minimal restriction (L)   Obturator Internus  moderate restriction; tenderness (L)     Neurological   Sensory/Reflex:  Vestibule: normal bilaterally  Cough Reflex: absent      Today's Treatment and Response:   Pt education was provided on exam findings, treatment diagnosis, treatment plan, expectations, and prognosis.  Today's Treatment       3/12/2025   Pelvic Treatment   Education Educated that PFM dysfunction can  be \"common not normal\". Educated on pelvic floor and pelvic organ anatomy and function (with use of model) with connection between bowel & bladder. Discussed causes of constipation and strategies for mitigating symptoms including increased fiber, water, and exercise. Discussed use of squatty potty/ stepstool to optimize PFM relaxation and promote bowel emptying without straining. Discussed changes that occur during pregnancy and post-partum period including but not limited to compromised cough reflex, diastasis recti, POP, and JOANIE, as well as strategies for restoring normal PFM and core strength and function. Discussed possibility for limiting bladder irritants, goal for optimizing water intake (~ 64 oz with slow increase from current), and implementing timed voiding to avoid prolonged void durations especially during workday. Discussed ultimate goal of urinating 1x every 2-4 hours daytime. Educated in bicep analogy/ pendulum analogy for PFM lengthening & contraction (momentum).   Other Timed Activities Minutes 15   Evaluation Minutes 35   Total Time Of Timed Procedures 15   Total Time Of Service-Based Procedures 35   Total Treatment Time 50   HEP Squatty potty/ stepstool with BM, slow increase in water        Patient was instructed in and issued a HEP for: Squatty potty/ stepstool with BM, slow increase in water    Charges:  PT EVAL: Moderate Complexity, 1SC  In agreement with evaluation findings and clinical rationale, this evaluation involved MODERATE COMPLEXITY decision making due to 1-2 personal factors/comorbidities, 3 or more body structures involved/activity limitations, and evolving symptoms as documented in the evaluation.                                                                       PLAN OF CARE:    Goals: (to be met in 10 visits)    Not Met Progress  Toward Partially Met Met   Patient will improve PERF score to at least 4/8/8//10 for improved pelvic floor function and pelvic organ support. [] []  [] []   Patient will demonstrate PFM lengthening WNL with coordinated diaphragmatic breathing x 10 reps to alleviate PFM pain and muscle tension for pain-free intercourse (Marinoff 0). [] [] [] []   Patient will report consumption of at least 25 grams of daily fiber and 64 ounces of water for improved stool consistency without regular fecal smearing. [] [] [] []   Patient will report increased BM frequency to at least 1x every other day without straining indicating improved stool motility and overall bowel health. [] [] [] []   Patient will report use of the KNACK at least 75% of the time to mitigate JOANIE with increases in IAP and to restore cough reflex. [] [] [] []   Patient will report daytime voids every 2-4 hours for bladder health to reduce likelihood for urinary leakage with full bladder/ urge.       Patient will report adherence to HEP for continued exercise benefits following cessation of PT. [] [] [] []      Frequency / Duration: Patient will be seen 1-2x/week or a total of 10  visits over a 90 day period. Treatment will include: Manual Therapy; Self-Care Home Management; Neuromuscular Re-education; Therapeutic Activities; Home Exercise Program instruction; Therapeutic Exercise; Electrical stimulation (unattended); Ultrasound    Education or treatment limitation: None   Rehab Potential: excellent     Patient/Family/Caregiver was advised of these findings, precautions, and treatment options and has agreed to actively participate in planning and for this course of care.    Thank you for your referral. Please co-sign or sign and return this letter via fax as soon as possible to [phone]. If you have any questions, please contact me at Dept: [phone]    Sincerely,  Electronically signed by therapist: Amanda Bernard PT  Physician's certification required: Yes  I certify the need for these services furnished under this plan of treatment and while under my  care.    X___________________________________________________ Date____________________    Certification From: 3/12/2025  To:6/10/2025

## 2025-03-19 ENCOUNTER — OFFICE VISIT (OUTPATIENT)
Dept: PHYSICAL THERAPY | Age: 35
End: 2025-03-19
Attending: OBSTETRICS & GYNECOLOGY
Payer: COMMERCIAL

## 2025-03-19 PROCEDURE — 97112 NEUROMUSCULAR REEDUCATION: CPT | Performed by: PHYSICAL THERAPIST

## 2025-03-19 PROCEDURE — 97110 THERAPEUTIC EXERCISES: CPT | Performed by: PHYSICAL THERAPIST

## 2025-03-19 NOTE — PROGRESS NOTES
Patient: Umu Stuart (34 year old, female) Referring Provider:  Insurance:   Diagnosis: JOANIE (stress urinary incontinence, female) (N39.3) Jass Hunt  [employer] [place]   Date of Surgery: n/a Next MD visit:  N/A   Precautions:  None No data recorded Referral Information:    Date of Evaluation: Req: 0, Auth: 0, Exp:     03/12/25 POC Auth Visits:  10        Today's Date   3/19/2025    Subjective  pt reports been busy 2/2 child ill, otherwise doing well; menses started this morning; question re: uterine position       Pain: 0/10     Objective  see flowsheet for details       Posture: supine rib flare  External Palpation: increased tension through B T/L PSM, increased tension under diaphragm/ upper abs L>R  Abdominal Wall Integrity: soft, pain-free, mod restricted fascial glide all planes  Diastasis Recti: (finger width depth while contracted)  Above Umbilicus: 0  Umbilicus: 0-1  Below Umbilicus: 0  +mild coning  Strength (MMT): hip abd: L 3+/5, R 4-/5, hip ext L 4-/5, R 4/5  Transverse Abdominis: visual descent with abdominal bracing with increased upper ab & oblique activation, +mild doming  Special Tests: breathing mechanics: efficient ant expansion with inhalation, lacking sufficient posterolateral expansion     Assessment  Emphasis on instructing in proper form with diaphragmatic breathing (focus on 360/ full excursion multi-directional: posterior & lateral especially) & with abdominal bracing for proper TA activation. Pt responds well to visual cueing with hand gestures to simulate correct muscle movement for the \"zipping\" of lower abs.    Goals (to be met in 10 visits)    Not Met Progress  Toward Partially Met Met   Patient will improve PERF score to at least 4/8/8//10 for improved pelvic floor function and pelvic organ support. [] [] [] []   Patient will demonstrate PFM lengthening WNL with coordinated diaphragmatic breathing x 10 reps to alleviate PFM pain and muscle tension for pain-free intercourse  (Marinoff 0). [] [] [] []   Patient will report consumption of at least 25 grams of daily fiber and 64 ounces of water for improved stool consistency without regular fecal smearing. [] [] [] []   Patient will report increased BM frequency to at least 1x every other day without straining indicating improved stool motility and overall bowel health. [] [] [] []   Patient will report use of the KNACK at least 75% of the time to mitigate JOANIE with increases in IAP and to restore cough reflex. [] [] [] []   Patient will report daytime voids every 2-4 hours for bladder health to reduce likelihood for urinary leakage with full bladder/ urge.       Patient will report adherence to HEP for continued exercise benefits following cessation of PT. [] [] [] []        Plan  Cont external ortho measures, add in PFM contraction for TA bracing    Treatment Last 4 Visits       3/19/2025   PT Treatment   Treatment Day 2          3/12/2025 3/19/2025   Pelvic Treatment   Treatment Day  2   Therapeutic Exercise  Cont ortho exam/ measures with education in findings   Neuro Re-Education  Instruction in TA bracing, with breathing coordination, in hooklying  -perform in different positions for home. Trial in L sidelying with gentle pt facilitation at ribcage (for lat expansion) with Diaphragmatic Breathing, in quadruped focus post expansion  Education: tissue laxity with connection with goals for TX   Education Educated that PFM dysfunction can be \"common not normal\". Educated on pelvic floor and pelvic organ anatomy and function (with use of model) with connection between bowel & bladder. Discussed causes of constipation and strategies for mitigating symptoms including increased fiber, water, and exercise. Discussed use of squatty potty/ stepstool to optimize PFM relaxation and promote bowel emptying without straining. Discussed changes that occur during pregnancy and post-partum period including but not limited to compromised cough reflex,  diastasis recti, POP, and JOANIE, as well as strategies for restoring normal PFM and core strength and function. Discussed possibility for limiting bladder irritants, goal for optimizing water intake (~ 64 oz with slow increase from current), and implementing timed voiding to avoid prolonged void durations especially during workday. Discussed ultimate goal of urinating 1x every 2-4 hours daytime. Educated in bicep analogy/ pendulum analogy for PFM lengthening & contraction (momentum).    Therapeutic Exercise Minutes  12   Neuro Re-Educ Minutes  28   Other Timed Activities Minutes 15    Evaluation Minutes 35    Total Time Of Timed Procedures 15 40   Total Time Of Service-Based Procedures 35 0   Total Treatment Time 50 40   HEP Squatty potty/ stepstool with BM, slow increase in water Access Code: [email or web address]  URL: [email or web address]  Date: 03/19/2025  Prepared by: Amanda Bernard    Exercises  - Supine Transversus Abdominis Bracing - Hands on Stomach  - 1 x daily - 7 x weekly - 2-3 min duration  - Sidelying Transversus Abdominis Bracing  - 1 x daily - 7 x weekly  - Quadruped Transversus Abdominis Bracing  - 1 x daily - 7 x weekly  - Seated Transversus Abdominis Bracing  - 1 x daily - 7 x weekly        HEP  Access Code: [email or web address]  URL: [email or web address]  Date: 03/19/2025  Prepared by: Amanda Bernard    Exercises  - Supine Transversus Abdominis Bracing - Hands on Stomach  - 1 x daily - 7 x weekly - 2-3 min duration  - Sidelying Transversus Abdominis Bracing  - 1 x daily - 7 x weekly  - Quadruped Transversus Abdominis Bracing  - 1 x daily - 7 x weekly  - Seated Transversus Abdominis Bracing  - 1 x daily - 7 x weekly    Charges  2NMR, 1TE

## 2025-03-26 ENCOUNTER — OFFICE VISIT (OUTPATIENT)
Dept: PHYSICAL THERAPY | Age: 35
End: 2025-03-26
Attending: OBSTETRICS & GYNECOLOGY
Payer: COMMERCIAL

## 2025-03-26 PROCEDURE — 97110 THERAPEUTIC EXERCISES: CPT | Performed by: PHYSICAL THERAPIST

## 2025-03-26 NOTE — PROGRESS NOTES
Patient: Umu Stuart (34 year old, female) Referring Provider:  Insurance:   Diagnosis: JOANIE (stress urinary incontinence, female) (N39.3) Jass Hunt  [employer] [place]   Date of Surgery: n/a Next MD visit:  N/A   Precautions:  None No data recorded Referral Information:    Date of Evaluation: Req: 0, Auth: 0, Exp:     03/12/25 POC Auth Visits:  10        Today's Date   3/26/2025    Subjective  today feels tired, woke up 4am to void; did more of the bracing in sitting- easier. Does get some upper back tightness associated with feeding baby       Pain: 0/10     Objective  see flowsheet for details       LE flexibility: HS B min restricted, piriformis B min restricted; hip PROM supine B ER WNL, B IR min restricted      Assessment  Focused on promoting increased full body incorporation with glute strengthening, LE & spinal mobility. Emphasized to pt that with adding more ex's to HEP pt should focus on the time she has not needing to add all the ex's daily. Relief noted with new ex's.    Goals (to be met in 10 visits)    Not Met Progress  Toward Partially Met Met   Patient will improve PERF score to at least 4/8/8//10 for improved pelvic floor function and pelvic organ support. [] [] [] []   Patient will demonstrate PFM lengthening WNL with coordinated diaphragmatic breathing x 10 reps to alleviate PFM pain and muscle tension for pain-free intercourse (Marinoff 0). [] [] [] []   Patient will report consumption of at least 25 grams of daily fiber and 64 ounces of water for improved stool consistency without regular fecal smearing. [] [] [] []   Patient will report increased BM frequency to at least 1x every other day without straining indicating improved stool motility and overall bowel health. [] [] [] []   Patient will report use of the KNACK at least 75% of the time to mitigate JOANIE with increases in IAP and to restore cough reflex. [] [] [] []   Patient will report daytime voids every 2-4 hours for bladder  health to reduce likelihood for urinary leakage with full bladder/ urge.       Patient will report adherence to HEP for continued exercise benefits following cessation of PT. [] [] [] []          Plan  Add in PFM contraction for TA bracing    Treatment Last 4 Visits  Treatment Day: 3       3/12/2025 3/19/2025 3/26/2025   Pelvic Treatment   Therapeutic Exercise  Cont ortho exam/ measures with education in findings Hooklying hip abd mckenna black pilates ring 5\" 2 X 15  Bridge with hip add mckenna yellow ball 5\" 2 X 10  Hooklying active HS stretch 5\" X 5 ea  Figure-4 piriformis stretch 2 X 30\" ea  S/L open the book stretch 5\" X 5 ea  Thread the needle with rotation & reach 5\" X 5 ea  Child's pose stretch 2 X 30\"  Doorway pec stretch 2 X 30\" B  Doorway QL stretch 2 X 30\" ea B   Neuro Re-Education  Instruction in TA bracing, with breathing coordination, in hooklying  -perform in different positions for home. Trial in L sidelying with gentle pt facilitation at ribcage (for lat expansion) with Diaphragmatic Breathing, in quadruped focus post expansion  Education: tissue laxity with connection with goals for TX    Education Educated that PFM dysfunction can be \"common not normal\". Educated on pelvic floor and pelvic organ anatomy and function (with use of model) with connection between bowel & bladder. Discussed causes of constipation and strategies for mitigating symptoms including increased fiber, water, and exercise. Discussed use of squatty potty/ stepstool to optimize PFM relaxation and promote bowel emptying without straining. Discussed changes that occur during pregnancy and post-partum period including but not limited to compromised cough reflex, diastasis recti, POP, and JOANIE, as well as strategies for restoring normal PFM and core strength and function. Discussed possibility for limiting bladder irritants, goal for optimizing water intake (~ 64 oz with slow increase from current), and implementing timed voiding to avoid  prolonged void durations especially during workday. Discussed ultimate goal of urinating 1x every 2-4 hours daytime. Educated in bicep analogy/ pendulum analogy for PFM lengthening & contraction (momentum).     Therapeutic Exercise Minutes  12 41   Neuro Re-Educ Minutes  28    Other Timed Activities Minutes 15     Evaluation Minutes 35     Total Time Of Timed Procedures 15 40 41   Total Time Of Service-Based Procedures 35 0 0   Total Treatment Time 50 40 41   HEP Squatty potty/ stepstool with BM, slow increase in water Access Code: [email or web address]  URL: [email or web address]  Date: 03/19/2025  Prepared by: Amanda Bernard    Exercises  - Supine Transversus Abdominis Bracing - Hands on Stomach  - 1 x daily - 7 x weekly - 2-3 min duration  - Sidelying Transversus Abdominis Bracing  - 1 x daily - 7 x weekly  - Quadruped Transversus Abdominis Bracing  - 1 x daily - 7 x weekly  - Seated Transversus Abdominis Bracing  - 1 x daily - 7 x weekly Access Code: [email or web address]  URL: [email or web address]  Date: 03/26/2025  Prepared by: Amanda Bernard    Exercises  - Supine Figure 4 Piriformis Stretch  - 1 x daily - 5 x weekly - 3 sets - 30 sec hold  - Hooklying Hamstring Stretch  - 1 x daily - 5 x weekly - 1 sets - 5-10 reps - 5 sec hold  - Hooklying Clamshell with Resistance  - 1 x daily - 3 x weekly - 2 sets - 15 reps - 5 sec hold  - Supine Bridge with Mini Swiss Ball Between Knees  - 1 x daily - 3 x weekly - 2 sets - 15 reps - 5 sec hold  - Sidelying Open Book Thoracic Lumbar Rotation and Extension  - 1 x daily - 5 x weekly - 1 sets - 10 reps - 5 sec hold  - Quadruped Full Range Thoracic Rotation with Reach  - 1 x daily - 5 x weekly - 1 sets - 10 reps - 5 sec hold  - Child's Pose Knees Apart and Hands Forward   - 1 x daily - 5 x weekly - 3 sets - 30 sec hold  - Doorway Pec Stretch at 90 Degrees Abduction  - 1 x daily - 5 x weekly - 3 sets - 30 sec hold  - Standing Quadratus Lumborum Stretch with Doorway  - 1 x  daily - 5 x weekly - 3 sets - 30 sec hold        HEP  Access Code: [email or web address]  URL: [email or web address]  Date: 03/26/2025  Prepared by: Amanda Bernard    Exercises  - Supine Figure 4 Piriformis Stretch  - 1 x daily - 5 x weekly - 3 sets - 30 sec hold  - Hooklying Hamstring Stretch  - 1 x daily - 5 x weekly - 1 sets - 5-10 reps - 5 sec hold  - Hooklying Clamshell with Resistance  - 1 x daily - 3 x weekly - 2 sets - 15 reps - 5 sec hold  - Supine Bridge with Mini Swiss Ball Between Knees  - 1 x daily - 3 x weekly - 2 sets - 15 reps - 5 sec hold  - Sidelying Open Book Thoracic Lumbar Rotation and Extension  - 1 x daily - 5 x weekly - 1 sets - 10 reps - 5 sec hold  - Quadruped Full Range Thoracic Rotation with Reach  - 1 x daily - 5 x weekly - 1 sets - 10 reps - 5 sec hold  - Child's Pose Knees Apart and Hands Forward   - 1 x daily - 5 x weekly - 3 sets - 30 sec hold  - Doorway Pec Stretch at 90 Degrees Abduction  - 1 x daily - 5 x weekly - 3 sets - 30 sec hold  - Standing Quadratus Lumborum Stretch with Doorway  - 1 x daily - 5 x weekly - 3 sets - 30 sec hold    [employer] dispensed for HEP    Charges  3TE

## 2025-03-31 ENCOUNTER — OFFICE VISIT (OUTPATIENT)
Dept: PHYSICAL THERAPY | Age: 35
End: 2025-03-31
Attending: OBSTETRICS & GYNECOLOGY
Payer: COMMERCIAL

## 2025-03-31 PROCEDURE — 97140 MANUAL THERAPY 1/> REGIONS: CPT | Performed by: PHYSICAL THERAPIST

## 2025-03-31 PROCEDURE — 97112 NEUROMUSCULAR REEDUCATION: CPT | Performed by: PHYSICAL THERAPIST

## 2025-03-31 NOTE — PATIENT INSTRUCTIONS
COORDINATING THE PELVIC FLOOR AND BREATHING DIAPHRAGM      Learning to coordinate and contract the pelvic floor with exhalation is a practical technique for bladder control. It is useful to contract the pelvic floor during exhalation which occurs during coughing, sneezing and laughing.  For continence the pelvic floor muscles should be trained to contract when you are exhaling.      FUNCTIONAL BREATHING EXERCISE  Focus on the relationship between your breathing diaphragm and the pelvic floor/ lower abdomen muscles.  As you breathe in, let the pelvic floor relax.  As you exhale, tighten and contract the pelvic floor as if the muscles are helping the air leave your lungs.              Start by lying on your back or reclining in a chair in a relaxed position. Place one hand on your chest and the other on your belly.   Relax your jaw by placing your tongue on the roof of your mouth so that your teeth are not touching.   Take a breath in through your nose, letting the abdomen and ribs expand and rise while you keep your upper chest relaxed.  Contract the pelvic floor (closing of rectum/ vagina) and lower abdomen muscles for an audible 5 count as you exhale through your mouth.  Practice coordinating the muscles for 3 minutes.             THE PELVIC BRACE    The pelvic brace is a combined pelvic floor and transverse abdominal low intensity muscle contraction. The transverse abdominal muscle is the deepest layer of the abdominal muscles and it aids in supporting the pelvic organs, spine and pelvis. Contraction of the transverse abdominal muscle creates a mild intensity tension of the lateral lower abdominal wall. Regular exercise of these muscles can build strength, awareness and retrain the muscles how to function together.     Correctly using and coordinating your pelvic floor and abdominal muscles can be the key to controlling your incontinence problem. The pelvic brace exercise creates an internal girdle to support your  bladder and pelvic organs. First learn to perform the pelvic brace correctly, and then use the brace with physical activities that put excessive pressure on the pelvic organs and pelvic floor muscles.     POSTURAL MUSCLES  Many muscles in our body function all day long to keep us upright against gravity. We rarely notice that these muscles are even working as they perform at very low intensity. The transverse abdominal and pelvic floor muscles are included in this group of muscles along with muscles such as your back and neck muscles. When one of the muscles get weak it requires retraining to get it working again.     GUIDELINES TO PERFORMING THE PELVIC BRACE  The pelvic brace contraction creates a feeling of deep tension and drawing in of the lower abdomen and pelvic floor.   Your spine (low back) should be in neutral position.  Your therapist will help you find your neutral position.  Always perform the exercise as instructed by your therapist.   The correct contraction creates a hollowing of the lower belly to the bikini line.  Do not strain, bear down or bulge your abdomen as you do the exercise.    PERFORMING THE PELVIC BRACE  Place your fingers on your lower abdomen just inside your pelvic bones. You should feel the low level contraction under your fingertips.  Contract the pelvic floor creating tension in the surface layer muscles that moves up to you abdomen and stops at the bikini line. This draws in and flattens the lower and side muscles of your abdomen.  Imagine drawing the vagina or scrotum into the body.   Tighten both the muscles as if you are trying to zip up a pair of pants to the bikini line.   Breathe while you maintain the low level contraction.  Remember, do not bulge your belly, strain or allow movement in the pelvis or back.  If you feel the quality of your exercise decline by starting to strain or bulge the abdominal muscles, stop your exercise session.  © 2004, [employer]          THE PELVIC BRACE WITH DAILY ACTIVITIES  Try the following daily activities in lying, sitting and standing positions.    The pelvic brace and cough  Breathe in, as you prepare to cough, bring your hand to your mouth and do the pelvic brace.   Hold the muscle and cough.  Now relax the brace.   If coughing causes leakage try this activity while clearing your throat.  Repeat ___ times    The pelvic brace and sit to stand  Breathe in as you prepare to stand   Do the pelvic brace.  Hold the muscles and stand up.   Be sure your therapist has shown you the proper technique.  Repeat ___ times.     The pelvic brace and lifting  Place a lightweight object of __lbs. on a table or the floor.  Place your feet shoulder width apart and keep your back straight.   Perform the pelvic brace, bend your knees to reach the object and lift.     Repeat ___ times.    Walking  Stand straight and upright with a neutral spine.  Do the pelvic brace while you walk during the day or practice walking in place for ____ minutes.    Other activities:  ______________________________________________________________________    ______________________________________________________________________    ______________________________________________________________________      © 2004, [employer]

## 2025-03-31 NOTE — PROGRESS NOTES
Patient: Umu Stuart (34 year old, female) Referring Provider:  Insurance:   Diagnosis: JOANIE (stress urinary incontinence, female) (N39.3) Jass Hunt  [employer]   Date of Surgery: n/a Next MD visit:  N/A   Precautions:  None No data recorded Referral Information:    Date of Evaluation: Req: 0, Auth: 0, Exp:     03/12/25 POC Auth Visits:  10        Today's Date   3/31/2025    Subjective  pt reports the band was good; pt reports nothing bad- no significant changes yet- steady       Pain: 0/10     Objective  see flowsheet for details       Informed verbal consent for internal pelvic evaluation given: Yes    External Observation:   Voluntary contraction: present   Voluntary relaxation: present  Involuntary relaxation: present - partial with breathing coordination    Mons pubis: WNL  Labia majora: WNL  Labia minora: WNL  Urethral meatus: WNL  Introitus: WNL  Perineal body: WNL    Sensory/Reflex:  Vestibule: normal bilaterally    Internal Examination   Scar: TTP, mod restriction    Pelvic Floor Muscle strength: (PERF= Power/Endurance/Reps/Fast) MMT: deferred  Accessory Muscle Use: gluteals, adductors    Internal Palpation: WNL except Superficial Transverse Perineal L minimal restriction and tenderness  Bulbocavernosus L minimal restriction and tenderness  Ischiocavernosus L minimal restriction and tenderness  Deep Transverse Perineal L minimal restriction  Compress Urethra/Sphincter L minimal restriction and tenderness  Urethrovaginalis L minimal restriction and tenderness  Pubococcygeus/Pubovaginalis L>R minimal restriction and tenderness  Obturator Internus L minimal restriction, moderate restriction, and tenderness  Restricted introitus 6, 3>9, 4>7 o'clock  ^Pain levels max 1-2/10 with above internal palpation     Assessment  Pt in agreement for internal PFM re-check & TX for affected PFM & to promote proper PFM & TA activation with breathing mechanics. Pt was able to engage PFM contraction with exhale  with partial PFM lengthening with inhale, though fatigued quickly, needing to work on increased endurance. More challenged as expected with advancing to pelvic brace. Pt will benefit from cont review of this foundational concept to ensure proper form & carryover to home.    Goals (to be met in 10 visits)    Not Met Progress  Toward Partially Met Met   Patient will improve PERF score to at least 4/8/8//10 for improved pelvic floor function and pelvic organ support. [] [] [] []   Patient will demonstrate PFM lengthening WNL with coordinated diaphragmatic breathing x 10 reps to alleviate PFM pain and muscle tension for pain-free intercourse (Marinoff 0). [] [] [] []   Patient will report consumption of at least 25 grams of daily fiber and 64 ounces of water for improved stool consistency without regular fecal smearing. [] [] [] []   Patient will report increased BM frequency to at least 1x every other day without straining indicating improved stool motility and overall bowel health. [] [] [] []   Patient will report use of the KNACK at least 75% of the time to mitigate JOANIE with increases in IAP and to restore cough reflex. [] [] [] []   Patient will report daytime voids every 2-4 hours for bladder health to reduce likelihood for urinary leakage with full bladder/ urge.       Patient will report adherence to HEP for continued exercise benefits following cessation of PT. [] [] [] []      Plan  Cont with hip/ glute strengthening, review pelvic brace & progress    Treatment Last 4 Visits  Treatment Day: 4       3/12/2025 3/19/2025 3/26/2025 3/31/2025   Pelvic Treatment   Therapeutic Exercise  Cont ortho exam/ measures with education in findings Hooklying hip abd mckenna black pilates ring 5\" 2 X 15  Bridge with hip add mckenna yellow ball 5\" 2 X 10  Hooklying active HS stretch 5\" X 5 ea  Figure-4 piriformis stretch 2 X 30\" ea  S/L open the book stretch 5\" X 5 ea  Thread the needle with rotation & reach 5\" X 5 ea  Child's pose  stretch 2 X 30\"  Doorway pec stretch 2 X 30\" B  Doorway QL stretch 2 X 30\" ea B    Neuro Re-Education  Instruction in TA bracing, with breathing coordination, in hooklying  -perform in different positions for home. Trial in L sidelying with gentle pt facilitation at ribcage (for lat expansion) with Diaphragmatic Breathing, in quadruped focus post expansion  Education: tissue laxity with connection with goals for TX  Training in PFM breathing coordination concurrent with internal manual cueing  Form check: TA bracing with breathing coordination concurrent with internal/ external manual cueing  Training in pelvic brace with breathing coordination concurrent with internal/ external manual cueing  -intro to pelvic brace with ADLs: to be cont   Manual Therapy    Internal PFM MFR to: introital stretch 6/3/9/4/7 o'clock; perineal scar mob/ stretch with instruction for performing indep for HEP; L layer 1/2/3, R layer 3 (see above for specific muscles addressed)  -Demo to pt findings/ treatment locations using pelvic model  -Education in potential of mild sensitivity, and/ or discomfort which can be alleviated with use of cold pack externally to vulva if needed   Education Educated that PFM dysfunction can be \"common not normal\". Educated on pelvic floor and pelvic organ anatomy and function (with use of model) with connection between bowel & bladder. Discussed causes of constipation and strategies for mitigating symptoms including increased fiber, water, and exercise. Discussed use of squatty potty/ stepstool to optimize PFM relaxation and promote bowel emptying without straining. Discussed changes that occur during pregnancy and post-partum period including but not limited to compromised cough reflex, diastasis recti, POP, and JOANIE, as well as strategies for restoring normal PFM and core strength and function. Discussed possibility for limiting bladder irritants, goal for optimizing water intake (~ 64 oz with slow increase  from current), and implementing timed voiding to avoid prolonged void durations especially during workday. Discussed ultimate goal of urinating 1x every 2-4 hours daytime. Educated in bicep analogy/ pendulum analogy for PFM lengthening & contraction (momentum).      Therapeutic Exercise Minutes  12 41    Neuro Re-Educ Minutes  28  16   Manual Therapy Minutes    24   Other Timed Activities Minutes 15      Evaluation Minutes 35      Total Time Of Timed Procedures 15 40 41 40   Total Time Of Service-Based Procedures 35 0 0 0   Total Treatment Time 50 40 41 40   HEP Squatty potty/ stepstool with BM, slow increase in water Access Code: [email or web address]  URL: [email or web address]  Date: 03/19/2025  Prepared by: Amanda Bernard    Exercises  - Supine Transversus Abdominis Bracing - Hands on Stomach  - 1 x daily - 7 x weekly - 2-3 min duration  - Sidelying Transversus Abdominis Bracing  - 1 x daily - 7 x weekly  - Quadruped Transversus Abdominis Bracing  - 1 x daily - 7 x weekly  - Seated Transversus Abdominis Bracing  - 1 x daily - 7 x weekly Access Code: [email or web address]  URL: [email or web address]  Date: 03/26/2025  Prepared by: Amanda Bernard    Exercises  - Supine Figure 4 Piriformis Stretch  - 1 x daily - 5 x weekly - 3 sets - 30 sec hold  - Hooklying Hamstring Stretch  - 1 x daily - 5 x weekly - 1 sets - 5-10 reps - 5 sec hold  - Hooklying Clamshell with Resistance  - 1 x daily - 3 x weekly - 2 sets - 15 reps - 5 sec hold  - Supine Bridge with Mini Swiss Ball Between Knees  - 1 x daily - 3 x weekly - 2 sets - 15 reps - 5 sec hold  - Sidelying Open Book Thoracic Lumbar Rotation and Extension  - 1 x daily - 5 x weekly - 1 sets - 10 reps - 5 sec hold  - Quadruped Full Range Thoracic Rotation with Reach  - 1 x daily - 5 x weekly - 1 sets - 10 reps - 5 sec hold  - Child's Pose Knees Apart and Hands Forward   - 1 x daily - 5 x weekly - 3 sets - 30 sec hold  - Doorway Pec Stretch at 90 Degrees Abduction  - 1 x  daily - 5 x weekly - 3 sets - 30 sec hold  - Standing Quadratus Lumborum Stretch with Doorway  - 1 x daily - 5 x weekly - 3 sets - 30 sec hold PFM breathing coordination- handout provided  Pelvic brace, pelvic brace with ADLs- handout provided  Perineal scar mob/ stretch        HEP  PFM breathing coordination- handout provided  Pelvic brace, pelvic brace with ADLs- handout provided  Perineal scar mob/ stretch    Charges  2MT, 1NMR

## 2025-04-08 ENCOUNTER — OFFICE VISIT (OUTPATIENT)
Dept: INTERNAL MEDICINE CLINIC | Facility: CLINIC | Age: 35
End: 2025-04-08
Payer: COMMERCIAL

## 2025-04-08 VITALS
HEIGHT: 64 IN | HEART RATE: 71 BPM | DIASTOLIC BLOOD PRESSURE: 60 MMHG | WEIGHT: 136 LBS | BODY MASS INDEX: 23.22 KG/M2 | SYSTOLIC BLOOD PRESSURE: 120 MMHG | TEMPERATURE: 98 F | OXYGEN SATURATION: 98 %

## 2025-04-08 DIAGNOSIS — E04.9 ENLARGED THYROID: ICD-10-CM

## 2025-04-08 DIAGNOSIS — Z00.00 ANNUAL PHYSICAL EXAM: Primary | ICD-10-CM

## 2025-04-08 PROBLEM — K92.89 GAS BLOAT SYNDROME: Status: RESOLVED | Noted: 2020-07-31 | Resolved: 2025-04-08

## 2025-04-08 PROBLEM — R10.13 DYSPEPSIA: Status: RESOLVED | Noted: 2020-07-31 | Resolved: 2025-04-08

## 2025-04-08 PROBLEM — R14.0 ABDOMINAL BLOATING: Status: RESOLVED | Noted: 2020-08-08 | Resolved: 2025-04-08

## 2025-04-08 PROBLEM — Z34.90 PREGNANCY (HCC): Status: RESOLVED | Noted: 2023-10-17 | Resolved: 2025-04-08

## 2025-04-08 PROCEDURE — 3078F DIAST BP <80 MM HG: CPT | Performed by: INTERNAL MEDICINE

## 2025-04-08 PROCEDURE — 99395 PREV VISIT EST AGE 18-39: CPT | Performed by: INTERNAL MEDICINE

## 2025-04-08 PROCEDURE — 3074F SYST BP LT 130 MM HG: CPT | Performed by: INTERNAL MEDICINE

## 2025-04-08 PROCEDURE — 3008F BODY MASS INDEX DOCD: CPT | Performed by: INTERNAL MEDICINE

## 2025-04-08 NOTE — H&P
Subjective:   Umu Stuart is a 34 year old female  who presents for Physical (Reviewed Preventative/Wellness form with patient. )     The following individual(s) verbally consented to be recorded using ambient AI listening technology and understand that they can each withdraw their consent to this listening technology at any point by asking the clinician to turn off or pause the recording:    Patient name: Umu Stuart          History of Present Illness  The patient, with a history of fibroids, presents for a general physical examination. She is currently breastfeeding her first child and is trying to conceive her second. She has been prescribed letrozole to aid conception but is hesitant to take it due to concerns about its effects while breastfeeding. She is considering weaning her child off breastfeeding in the summer.    The patient's menstrual cycle returned in November after an 11-month absence following the birth of her first child. She reports that her periods are heavier than she is used to, particularly in the first few days, but feels they have been improving over the past four months.    The patient also mentions a possible iron deficiency, for which she had two iron infusions in the past.     History/Other:    Chief Complaint Reviewed and Verified  Nursing Notes Reviewed and   Verified  Tobacco Reviewed  Allergies Reviewed  Medications Reviewed    Problem List Reviewed  Medical History Reviewed  Surgical History   Reviewed  OB Status Reviewed  Family History Reviewed  Social History   Reviewed         Current Outpatient Medications   Medication Sig Dispense Refill    prenatal vitamin with DHA 27-0.8-228 MG Oral Cap Take 1 capsule by mouth daily.      letrozole 2.5 MG Oral Tab Take 1 tablet (2.5 mg total) by mouth daily. Days 3 - 7 of every other month (Patient not taking: Reported on 4/8/2025) 15 tablet 1       Review of Systems:  Pertinent items are noted in HPI.  A comprehensive  10 point review of systems was completed.  Pertinent positives and negatives noted in the the HPI.        Objective:   /60 (BP Location: Right arm, Patient Position: Sitting, Cuff Size: adult)   Pulse 71   Temp 98 °F (36.7 °C) (Temporal)   Ht 5' 4\" (1.626 m)   Wt 136 lb (61.7 kg)   LMP 03/19/2025 (Exact Date)   SpO2 98%   Breastfeeding Yes   BMI 23.34 kg/m²  Estimated body mass index is 23.34 kg/m² as calculated from the following:    Height as of this encounter: 5' 4\" (1.626 m).    Weight as of this encounter: 136 lb (61.7 kg).    Physical Exam  GENERAL: Alert, cooperative,  no acute distress.  HEENT: Normocephalic, normal oropharynx, moist mucous membranes, pupils equal, round, and reactive to light, cerumen present  NECK: Thyroid slightly prominent. No diffuse lymph node enlargement.   CHEST: Clear to auscultation bilaterally, no wheezes, rhonchi, or crackles.  CARDIOVASCULAR: Normal heart rate and rhythm, S1 and S2 normal without murmurs.  ABDOMEN: Soft, non-tender, non-distended,   EXTREMITIES: No  edema.  MUSCULOSKELETAL:  normal range of motion, non-tender.  NEUROLOGICAL: Cranial nerves grossly intact, moves all extremities without gross motor or sensory deficit.  SKIN: no acute findings     Assessment & Plan:     Assessment & Plan  Preconception Counseling  Discussed weaning off breastfeeding to enhance fertility and the potential use of letrozole if natural conception is unsuccessful as per ob/gyne.  - Wean off breastfeeding as able.  - Attempt natural conception for a few months.  - If natural conception is unsuccessful, consider starting letrozole.  Discussed when to seek sooner medical attention; voiced understanding   -follow-up with ob/gyne        Possibly Thyroid Enlargement  Slightly prominent thyroid, plan to rule out abnormalities with ultrasound.  - Order thyroid ultrasound.  - Check thyroid function tests.    Earwax Accumulation  Advised home management with Debrox drops.  - Advise  use of Debrox drops, 5-10 drops twice a day for three days if needed.  - If earwax persists, consider in-office irrigation.    Routine Health Maintenance  Up to date with Pap smear, no immediate family history of breast or colon cancer. Discussed routine health maintenance.  - Order blood tests as part of health maintenance.  - Advise annual eye examination.          This note was created utilizing [employer] speech recognition software which may lead to grammatical errors/typos.   If any word or phrase is confusing, it is likely due to recognition error. Please ask the provider for clarification.      Carol Srinivasan MD

## 2025-04-12 ENCOUNTER — LAB ENCOUNTER (OUTPATIENT)
Dept: LAB | Age: 35
End: 2025-04-12
Attending: INTERNAL MEDICINE
Payer: COMMERCIAL

## 2025-04-12 DIAGNOSIS — Z00.00 ANNUAL PHYSICAL EXAM: ICD-10-CM

## 2025-04-12 LAB
ALBUMIN SERPL-MCNC: 4.8 G/DL (ref 3.2–4.8)
ALBUMIN/GLOB SERPL: 1.8 {RATIO} (ref 1–2)
ALP LIVER SERPL-CCNC: 88 U/L (ref 37–98)
ALT SERPL-CCNC: 10 U/L (ref 10–49)
ANION GAP SERPL CALC-SCNC: 9 MMOL/L (ref 0–18)
AST SERPL-CCNC: 15 U/L (ref ?–34)
BASOPHILS # BLD AUTO: 0.04 X10(3) UL (ref 0–0.2)
BASOPHILS NFR BLD AUTO: 0.6 %
BILIRUB SERPL-MCNC: 1.7 MG/DL (ref 0.3–1.2)
BUN BLD-MCNC: 12 MG/DL (ref 9–23)
CALCIUM BLD-MCNC: 9.6 MG/DL (ref 8.7–10.6)
CHLORIDE SERPL-SCNC: 104 MMOL/L (ref 98–112)
CHOLEST SERPL-MCNC: 160 MG/DL (ref ?–200)
CO2 SERPL-SCNC: 25 MMOL/L (ref 21–32)
CREAT BLD-MCNC: 0.71 MG/DL (ref 0.55–1.02)
DEPRECATED HBV CORE AB SER IA-ACNC: 13 NG/ML (ref 50–306)
EGFRCR SERPLBLD CKD-EPI 2021: 115 ML/MIN/1.73M2 (ref 60–?)
EOSINOPHIL # BLD AUTO: 0.12 X10(3) UL (ref 0–0.7)
EOSINOPHIL NFR BLD AUTO: 1.9 %
ERYTHROCYTE [DISTWIDTH] IN BLOOD BY AUTOMATED COUNT: 13.5 %
EST. AVERAGE GLUCOSE BLD GHB EST-MCNC: 117 MG/DL (ref 68–126)
FASTING PATIENT LIPID ANSWER: YES
FASTING STATUS PATIENT QL REPORTED: YES
FOLATE SERPL-MCNC: 9.9 NG/ML (ref 5.4–?)
GLOBULIN PLAS-MCNC: 2.6 G/DL (ref 2–3.5)
GLUCOSE BLD-MCNC: 95 MG/DL (ref 70–99)
HBA1C MFR BLD: 5.7 % (ref ?–5.7)
HCT VFR BLD AUTO: 39.1 % (ref 35–48)
HDLC SERPL-MCNC: 100 MG/DL (ref 40–59)
HGB BLD-MCNC: 13.3 G/DL (ref 12–16)
IMM GRANULOCYTES # BLD AUTO: 0.01 X10(3) UL (ref 0–1)
IMM GRANULOCYTES NFR BLD: 0.2 %
IRON SATN MFR SERPL: 26 % (ref 15–50)
IRON SERPL-MCNC: 121 UG/DL (ref 50–170)
LDLC SERPL CALC-MCNC: 44 MG/DL (ref ?–100)
LYMPHOCYTES # BLD AUTO: 2.21 X10(3) UL (ref 1–4)
LYMPHOCYTES NFR BLD AUTO: 34.6 %
MCH RBC QN AUTO: 32 PG (ref 26–34)
MCHC RBC AUTO-ENTMCNC: 34 G/DL (ref 31–37)
MCV RBC AUTO: 94.2 FL (ref 80–100)
MONOCYTES # BLD AUTO: 0.32 X10(3) UL (ref 0.1–1)
MONOCYTES NFR BLD AUTO: 5 %
NEUTROPHILS # BLD AUTO: 3.69 X10 (3) UL (ref 1.5–7.7)
NEUTROPHILS # BLD AUTO: 3.69 X10(3) UL (ref 1.5–7.7)
NEUTROPHILS NFR BLD AUTO: 57.7 %
NONHDLC SERPL-MCNC: 60 MG/DL (ref ?–130)
OSMOLALITY SERPL CALC.SUM OF ELEC: 286 MOSM/KG (ref 275–295)
PLATELET # BLD AUTO: 308 10(3)UL (ref 150–450)
POTASSIUM SERPL-SCNC: 3.8 MMOL/L (ref 3.5–5.1)
PROT SERPL-MCNC: 7.4 G/DL (ref 5.7–8.2)
RBC # BLD AUTO: 4.15 X10(6)UL (ref 3.8–5.3)
SODIUM SERPL-SCNC: 138 MMOL/L (ref 136–145)
TOTAL IRON BINDING CAPACITY: 471 UG/DL (ref 250–425)
TRANSFERRIN SERPL-MCNC: 395 MG/DL (ref 250–380)
TRIGL SERPL-MCNC: 92 MG/DL (ref 30–149)
TSI SER-ACNC: 1.23 UIU/ML (ref 0.55–4.78)
VIT B12 SERPL-MCNC: 382 PG/ML (ref 211–911)
VIT D+METAB SERPL-MCNC: 15.9 NG/ML (ref 30–100)
VLDLC SERPL CALC-MCNC: 13 MG/DL (ref 0–30)
WBC # BLD AUTO: 6.4 X10(3) UL (ref 4–11)

## 2025-04-12 PROCEDURE — 82306 VITAMIN D 25 HYDROXY: CPT

## 2025-04-12 PROCEDURE — 82607 VITAMIN B-12: CPT

## 2025-04-12 PROCEDURE — 83540 ASSAY OF IRON: CPT

## 2025-04-12 PROCEDURE — 85025 COMPLETE CBC W/AUTO DIFF WBC: CPT

## 2025-04-12 PROCEDURE — 82728 ASSAY OF FERRITIN: CPT

## 2025-04-12 PROCEDURE — 36415 COLL VENOUS BLD VENIPUNCTURE: CPT

## 2025-04-12 PROCEDURE — 83550 IRON BINDING TEST: CPT

## 2025-04-12 PROCEDURE — 82746 ASSAY OF FOLIC ACID SERUM: CPT

## 2025-04-12 PROCEDURE — 80053 COMPREHEN METABOLIC PANEL: CPT

## 2025-04-12 PROCEDURE — 83036 HEMOGLOBIN GLYCOSYLATED A1C: CPT

## 2025-04-12 PROCEDURE — 80061 LIPID PANEL: CPT

## 2025-04-12 PROCEDURE — 84443 ASSAY THYROID STIM HORMONE: CPT

## 2025-04-13 ENCOUNTER — HOSPITAL ENCOUNTER (OUTPATIENT)
Dept: ULTRASOUND IMAGING | Age: 35
Discharge: HOME | End: 2025-04-13
Attending: INTERNAL MEDICINE
Payer: COMMERCIAL

## 2025-04-13 ENCOUNTER — HOSPITAL ENCOUNTER (OUTPATIENT)
Dept: ULTRASOUND IMAGING | Age: 35
End: 2025-04-13
Attending: INTERNAL MEDICINE
Payer: COMMERCIAL

## 2025-04-13 DIAGNOSIS — E04.9 ENLARGED THYROID: ICD-10-CM

## 2025-04-13 PROCEDURE — 76536 US EXAM OF HEAD AND NECK: CPT | Performed by: INTERNAL MEDICINE

## 2025-04-14 ENCOUNTER — OFFICE VISIT (OUTPATIENT)
Dept: PHYSICAL THERAPY | Age: 35
End: 2025-04-14
Attending: OBSTETRICS & GYNECOLOGY
Payer: COMMERCIAL

## 2025-04-14 PROCEDURE — 97535 SELF CARE MNGMENT TRAINING: CPT | Performed by: PHYSICAL THERAPIST

## 2025-04-14 PROCEDURE — 97110 THERAPEUTIC EXERCISES: CPT | Performed by: PHYSICAL THERAPIST

## 2025-04-14 NOTE — PROGRESS NOTES
Patient: Umu Stuart (34 year old, female) Referring Provider:  Insurance:   Diagnosis: JOANIE (stress urinary incontinence, female) (N39.3) Jass Hunt  [employer]   Date of Surgery: n/a Next MD visit:  N/A   Precautions:  None No data recorded Referral Information:    Date of Evaluation: Req: 0, Auth: 0, Exp:     03/12/25 POC Auth Visits:  10        Today's Date   4/14/2025    Subjective  better, had physical, low in iron (deficient), thyroid is good, vitamin D is down; it reports cont breastfeeding in evenings, pumping 1x/ day       Pain: 0/10     Objective  see flowsheet for details       Assessment  Emphasis on both glute strengthening to support PFM/ pelvic organs as well as targeted PFM stretches to alleviate tension/ STRs. Pt had excellent tolerance without increase in symptoms. Cued for pelvic stabilization with hip strengthening to prevent compensation/ substitution.    Goals (to be met in 10 visits)      Not Met Progress  Toward Partially Met Met   Patient will improve PERF score to at least 4/8/8//10 for improved pelvic floor function and pelvic organ support. [] [] [] []   Patient will demonstrate PFM lengthening WNL with coordinated diaphragmatic breathing x 10 reps to alleviate PFM pain and muscle tension for pain-free intercourse (Marinoff 0). [] [] [] []   Patient will report consumption of at least 25 grams of daily fiber and 64 ounces of water for improved stool consistency without regular fecal smearing. [] [] [] []   Patient will report increased BM frequency to at least 1x every other day without straining indicating improved stool motility and overall bowel health. [] [] [] []   Patient will report use of the KNACK at least 75% of the time to mitigate JOANIE with increases in IAP and to restore cough reflex. [] [] [] []   Patient will report daytime voids every 2-4 hours for bladder health to reduce likelihood for urinary leakage with full bladder/ urge.       Patient will report  adherence to HEP for continued exercise benefits following cessation of PT. [] [] [] []          Plan  Review pelvic brace & progress    Treatment Last 4 Visits  Treatment Day: 5       3/19/2025 3/26/2025 3/31/2025 4/14/2025   Pelvic Treatment   Therapeutic Exercise Cont ortho exam/ measures with education in findings Hooklying hip abd mckenna black pilates ring 5\" 2 X 15  Bridge with hip add mckenna yellow ball 5\" 2 X 10  Hooklying active HS stretch 5\" X 5 ea  Figure-4 piriformis stretch 2 X 30\" ea  S/L open the book stretch 5\" X 5 ea  Thread the needle with rotation & reach 5\" X 5 ea  Child's pose stretch 2 X 30\"  Doorway pec stretch 2 X 30\" B  Doorway QL stretch 2 X 30\" ea B  HEP parameters recommendations: intensity  S/L hip abd 2 X 15 ea  Clams 2 X 15 ea  Reverse clams 2 X 15 ea  Butterfly stretch X 2'  Happy baby stretch 2 X 30\"  Flat back stretch tableside 1 X 30\"  Hip add stretch tableside 1 X 30\" ea  Deep squat stretch X 2' at wall  Seated towel roll perineal stretch X 1' ea  ^pt to focus on breathing with stretches   Neuro Re-Education Instruction in TA bracing, with breathing coordination, in hooklying  -perform in different positions for home. Trial in L sidelying with gentle pt facilitation at ribcage (for lat expansion) with Diaphragmatic Breathing, in quadruped focus post expansion  Education: tissue laxity with connection with goals for TX  Training in PFM breathing coordination concurrent with internal manual cueing  Form check: TA bracing with breathing coordination concurrent with internal/ external manual cueing  Training in pelvic brace with breathing coordination concurrent with internal/ external manual cueing  -intro to pelvic brace with ADLs: to be cont    Manual Therapy   Internal PFM MFR to: introital stretch 6/3/9/4/7 o'clock; perineal scar mob/ stretch with instruction for performing indep for HEP; L layer 1/2/3, R layer 3 (see above for specific muscles addressed)  -Demo to pt findings/  treatment locations using pelvic model  -Education in potential of mild sensitivity, and/ or discomfort which can be alleviated with use of cold pack externally to vulva if needed    Education    Self-Care: Discuss with provider if iron makes her constipated; Increase water if increasing fiber- suggested goals/ norms for water intake with PFM connection to breastfeeding (why it is important)   Therapeutic Exercise Minutes 12 41  35   Neuro Re-Educ Minutes 28  16    Manual Therapy Minutes   24    Other Timed Activities Minutes    10   Total Time Of Timed Procedures 40 41 40 45   Total Time Of Service-Based Procedures 0 0 0 0   Total Treatment Time 40 41 40 45   HEP Access Code: [email or web address]  URL: [email or web address]  Date: 03/19/2025  Prepared by: Amanda Bernard    Exercises  - Supine Transversus Abdominis Bracing - Hands on Stomach  - 1 x daily - 7 x weekly - 2-3 min duration  - Sidelying Transversus Abdominis Bracing  - 1 x daily - 7 x weekly  - Quadruped Transversus Abdominis Bracing  - 1 x daily - 7 x weekly  - Seated Transversus Abdominis Bracing  - 1 x daily - 7 x weekly Access Code: [email or web address]  URL: [email or web address]  Date: 03/26/2025  Prepared by: Amanda Bernard    Exercises  - Supine Figure 4 Piriformis Stretch  - 1 x daily - 5 x weekly - 3 sets - 30 sec hold  - Hooklying Hamstring Stretch  - 1 x daily - 5 x weekly - 1 sets - 5-10 reps - 5 sec hold  - Hooklying Clamshell with Resistance  - 1 x daily - 3 x weekly - 2 sets - 15 reps - 5 sec hold  - Supine Bridge with Mini Swiss Ball Between Knees  - 1 x daily - 3 x weekly - 2 sets - 15 reps - 5 sec hold  - Sidelying Open Book Thoracic Lumbar Rotation and Extension  - 1 x daily - 5 x weekly - 1 sets - 10 reps - 5 sec hold  - Quadruped Full Range Thoracic Rotation with Reach  - 1 x daily - 5 x weekly - 1 sets - 10 reps - 5 sec hold  - Child's Pose Knees Apart and Hands Forward   - 1 x daily - 5 x weekly - 3 sets - 30 sec hold  -  Doorway Pec Stretch at 90 Degrees Abduction  - 1 x daily - 5 x weekly - 3 sets - 30 sec hold  - Standing Quadratus Lumborum Stretch with Doorway  - 1 x daily - 5 x weekly - 3 sets - 30 sec hold PFM breathing coordination- handout provided  Pelvic brace, pelvic brace with ADLs- handout provided  Perineal scar mob/ stretch Access Code: [email or web address]  URL: [email or web address]  Date: 04/14/2025  Prepared by: Amanda Bernard    Exercises  - Supine Figure 4 Piriformis Stretch  - 1 x daily - 5 x weekly - 3 sets - 30 sec hold  - Hooklying Hamstring Stretch  - 1 x daily - 5 x weekly - 1 sets - 5-10 reps - 5 sec hold  - Hooklying Clamshell with Resistance  - 1 x daily - 3 x weekly - 2 sets - 15 reps - 5 sec hold  - Supine Bridge with Mini Swiss Ball Between Knees  - 1 x daily - 3 x weekly - 2 sets - 15 reps - 5 sec hold  - Sidelying Open Book Thoracic Lumbar Rotation and Extension  - 1 x daily - 5 x weekly - 1 sets - 10 reps - 5 sec hold  - Quadruped Full Range Thoracic Rotation with Reach  - 1 x daily - 5 x weekly - 1 sets - 10 reps - 5 sec hold  - Child's Pose Knees Apart and Hands Forward   - 1 x daily - 5 x weekly - 3 sets - 30 sec hold  - Doorway Pec Stretch at 90 Degrees Abduction  - 1 x daily - 5 x weekly - 3 sets - 30 sec hold  - Standing Quadratus Lumborum Stretch with Doorway  - 1 x daily - 5 x weekly - 3 sets - 30 sec hold  - Supine Butterfly Groin Stretch  - 1 x daily - 7 x weekly - 3 sets - 30 sec hold  - Supine Pelvic Floor Stretch  - 1 x daily - 7 x weekly - 3 sets - 30 sec hold  - Standing 'L' Stretch at Counter  - 1 x daily - 7 x weekly - 3 sets - 30 sec hold  - Kneeling Adductor Stretch with Hip External Rotation  - 1 x daily - 7 x weekly - 3 sets - 30 sec hold  - Yoga Squat for Pelvic Floor Relaxation  - 1 x daily - 7 x weekly - 2-3 min hold  - Levator Ani Self Trigger Point Release  - 1 x daily - 7 x weekly - 1-3 min duration        HEP  Access Code: [email or web address]  URL:  [email or web address]  Date: 04/14/2025  Prepared by: Amanda Bernard    Exercises  - Supine Figure 4 Piriformis Stretch  - 1 x daily - 5 x weekly - 3 sets - 30 sec hold  - Hooklying Hamstring Stretch  - 1 x daily - 5 x weekly - 1 sets - 5-10 reps - 5 sec hold  - Hooklying Clamshell with Resistance  - 1 x daily - 3 x weekly - 2 sets - 15 reps - 5 sec hold  - Supine Bridge with Mini Swiss Ball Between Knees  - 1 x daily - 3 x weekly - 2 sets - 15 reps - 5 sec hold  - Sidelying Open Book Thoracic Lumbar Rotation and Extension  - 1 x daily - 5 x weekly - 1 sets - 10 reps - 5 sec hold  - Quadruped Full Range Thoracic Rotation with Reach  - 1 x daily - 5 x weekly - 1 sets - 10 reps - 5 sec hold  - Child's Pose Knees Apart and Hands Forward   - 1 x daily - 5 x weekly - 3 sets - 30 sec hold  - Doorway Pec Stretch at 90 Degrees Abduction  - 1 x daily - 5 x weekly - 3 sets - 30 sec hold  - Standing Quadratus Lumborum Stretch with Doorway  - 1 x daily - 5 x weekly - 3 sets - 30 sec hold  - Supine Butterfly Groin Stretch  - 1 x daily - 7 x weekly - 3 sets - 30 sec hold  - Supine Pelvic Floor Stretch  - 1 x daily - 7 x weekly - 3 sets - 30 sec hold  - Standing 'L' Stretch at Counter  - 1 x daily - 7 x weekly - 3 sets - 30 sec hold  - Kneeling Adductor Stretch with Hip External Rotation  - 1 x daily - 7 x weekly - 3 sets - 30 sec hold  - Yoga Squat for Pelvic Floor Relaxation  - 1 x daily - 7 x weekly - 2-3 min hold  - Levator Ani Self Trigger Point Release  - 1 x daily - 7 x weekly - 1-3 min duration    Charges  2TE, 1SC

## 2025-04-21 ENCOUNTER — OFFICE VISIT (OUTPATIENT)
Dept: PHYSICAL THERAPY | Age: 35
End: 2025-04-21
Attending: OBSTETRICS & GYNECOLOGY
Payer: COMMERCIAL

## 2025-04-21 PROCEDURE — 97112 NEUROMUSCULAR REEDUCATION: CPT | Performed by: PHYSICAL THERAPIST

## 2025-04-21 PROCEDURE — 97110 THERAPEUTIC EXERCISES: CPT | Performed by: PHYSICAL THERAPIST

## 2025-04-21 NOTE — PROGRESS NOTES
Patient: Umu Stuart (34 year old, female) Referring Provider:  Insurance:   Diagnosis: JOANIE (stress urinary incontinence, female) (N39.3) Jass Hunt  [employer]   Date of Surgery: n/a Next MD visit:  N/A   Precautions:  None No data recorded Referral Information:    Date of Evaluation: Req: 0, Auth: 0, Exp:     03/12/25 POC Auth Visits:  10        Today's Date   4/21/2025    Subjective  less symptoms, hasn't caught herself dripping- maybe getting better with squeezing. definitely not worse. admits needs get better w/ drinking water. is a few days late on cycle. has not tested yet. notes increased BM frequency- maybe a little harder from iron- not painful, some are smoother, is having more fiber (black beans), feeling more energy       Pain: 0/10     Objective          Assessment  Cues for ensuring proper lower abdominal bracing without substitution or overactivation from obliques. Pt responded well to manual & verbal cues, as well as use of hand movements to simulate the correct mechanics of breathing & contraction. Able to progress with addition of proximal hip work (previously done separately) to further add stabilization activity.    Goals (to be met in 10 visits)      Not Met Progress  Toward Partially Met Met   Patient will improve PERF score to at least 4/8/8//10 for improved pelvic floor function and pelvic organ support. [] [] [] []   Patient will demonstrate PFM lengthening WNL with coordinated diaphragmatic breathing x 10 reps to alleviate PFM pain and muscle tension for pain-free intercourse (Marinoff 0). [] [] [] []   Patient will report consumption of at least 25 grams of daily fiber and 64 ounces of water for improved stool consistency without regular fecal smearing. [] [] [] []   Patient will report increased BM frequency to at least 1x every other day without straining indicating improved stool motility and overall bowel health. [] [] [] []   Patient will report use of the KNACK at  least 75% of the time to mitigate JOANIE with increases in IAP and to restore cough reflex. [] [] [] []   Patient will report daytime voids every 2-4 hours for bladder health to reduce likelihood for urinary leakage with full bladder/ urge.       Patient will report adherence to HEP for continued exercise benefits following cessation of PT. [] [] [] []            Plan  Cont with abdominal & glute strengthening. Pt on wait list for next week, will be contacted if schedule opens for time(s) pt is able to come in.    Treatment Last 4 Visits  Treatment Day: 6       3/26/2025 3/31/2025 4/14/2025 4/21/2025   Pelvic Treatment   Therapeutic Exercise Hooklying hip abd mckenna black pilates ring 5\" 2 X 15  Bridge with hip add mckenna yellow ball 5\" 2 X 10  Hooklying active HS stretch 5\" X 5 ea  Figure-4 piriformis stretch 2 X 30\" ea  S/L open the book stretch 5\" X 5 ea  Thread the needle with rotation & reach 5\" X 5 ea  Child's pose stretch 2 X 30\"  Doorway pec stretch 2 X 30\" B  Doorway QL stretch 2 X 30\" ea B  HEP parameters recommendations: intensity  S/L hip abd 2 X 15 ea  Clams 2 X 15 ea  Reverse clams 2 X 15 ea  Butterfly stretch X 2'  Happy baby stretch 2 X 30\"  Flat back stretch tableside 1 X 30\"  Hip add stretch tableside 1 X 30\" ea  Deep squat stretch X 2' at wall  Seated towel roll perineal stretch X 1' ea  ^pt to focus on breathing with stretches Deep squat stretch at wall X 3'  Exercise modification at gym  At //bars:  -flat back stretch 2 X 30\"  -hip add stretch 2 X 30\" ea   Neuro Re-Education  Training in PFM breathing coordination concurrent with internal manual cueing  Form check: TA bracing with breathing coordination concurrent with internal/ external manual cueing  Training in pelvic brace with breathing coordination concurrent with internal/ external manual cueing  -intro to pelvic brace with ADLs: to be cont  Pelvic brace breathing coordination hooklying X 3'  Pelvic brace breathing coordination hooklying with  hip add mckenna with yellow ball X 3'  Pelvic brace breathing coordination hooklying with hip abd mckenna with black pilates ring X 3'  Pilates ring TA presses for form check TA activation  Pelvic brace breathing coordination with alt LE march 2 X 1'  Pelvic brace breathing coordination with BKFO 3 X 1'   Manual Therapy  Internal PFM MFR to: introital stretch 6/3/9/4/7 o'clock; perineal scar mob/ stretch with instruction for performing indep for HEP; L layer 1/2/3, R layer 3 (see above for specific muscles addressed)  -Demo to pt findings/ treatment locations using pelvic model  -Education in potential of mild sensitivity, and/ or discomfort which can be alleviated with use of cold pack externally to vulva if needed     Education   Self-Care: Discuss with provider if iron makes her constipated; Increase water if increasing fiber- suggested goals/ norms for water intake with PFM connection to breastfeeding (why it is important)    Therapeutic Exercise Minutes 41  35 9   Neuro Re-Educ Minutes  16  34   Manual Therapy Minutes  24     Other Timed Activities Minutes   10    Total Time Of Timed Procedures 41 40 45 43   Total Time Of Service-Based Procedures 0 0 0 0   Total Treatment Time 41 40 45 43   HEP Access Code: [email or web address]  URL: [email or web address]  Date: 03/26/2025  Prepared by: Amanda Bernard    Exercises  - Supine Figure 4 Piriformis Stretch  - 1 x daily - 5 x weekly - 3 sets - 30 sec hold  - Hooklying Hamstring Stretch  - 1 x daily - 5 x weekly - 1 sets - 5-10 reps - 5 sec hold  - Hooklying Clamshell with Resistance  - 1 x daily - 3 x weekly - 2 sets - 15 reps - 5 sec hold  - Supine Bridge with Mini Swiss Ball Between Knees  - 1 x daily - 3 x weekly - 2 sets - 15 reps - 5 sec hold  - Sidelying Open Book Thoracic Lumbar Rotation and Extension  - 1 x daily - 5 x weekly - 1 sets - 10 reps - 5 sec hold  - Quadruped Full Range Thoracic Rotation with Reach  - 1 x daily - 5 x weekly - 1 sets - 10 reps - 5 sec  hold  - Child's Pose Knees Apart and Hands Forward   - 1 x daily - 5 x weekly - 3 sets - 30 sec hold  - Doorway Pec Stretch at 90 Degrees Abduction  - 1 x daily - 5 x weekly - 3 sets - 30 sec hold  - Standing Quadratus Lumborum Stretch with Doorway  - 1 x daily - 5 x weekly - 3 sets - 30 sec hold PFM breathing coordination- handout provided  Pelvic brace, pelvic brace with ADLs- handout provided  Perineal scar mob/ stretch Access Code: [email or web address]  URL: [email or web address]  Date: 04/14/2025  Prepared by: Amanda Bernard    Exercises  - Supine Figure 4 Piriformis Stretch  - 1 x daily - 5 x weekly - 3 sets - 30 sec hold  - Hooklying Hamstring Stretch  - 1 x daily - 5 x weekly - 1 sets - 5-10 reps - 5 sec hold  - Hooklying Clamshell with Resistance  - 1 x daily - 3 x weekly - 2 sets - 15 reps - 5 sec hold  - Supine Bridge with Mini Swiss Ball Between Knees  - 1 x daily - 3 x weekly - 2 sets - 15 reps - 5 sec hold  - Sidelying Open Book Thoracic Lumbar Rotation and Extension  - 1 x daily - 5 x weekly - 1 sets - 10 reps - 5 sec hold  - Quadruped Full Range Thoracic Rotation with Reach  - 1 x daily - 5 x weekly - 1 sets - 10 reps - 5 sec hold  - Child's Pose Knees Apart and Hands Forward   - 1 x daily - 5 x weekly - 3 sets - 30 sec hold  - Doorway Pec Stretch at 90 Degrees Abduction  - 1 x daily - 5 x weekly - 3 sets - 30 sec hold  - Standing Quadratus Lumborum Stretch with Doorway  - 1 x daily - 5 x weekly - 3 sets - 30 sec hold  - Supine Butterfly Groin Stretch  - 1 x daily - 7 x weekly - 3 sets - 30 sec hold  - Supine Pelvic Floor Stretch  - 1 x daily - 7 x weekly - 3 sets - 30 sec hold  - Standing 'L' Stretch at Counter  - 1 x daily - 7 x weekly - 3 sets - 30 sec hold  - Kneeling Adductor Stretch with Hip External Rotation  - 1 x daily - 7 x weekly - 3 sets - 30 sec hold  - Yoga Squat for Pelvic Floor Relaxation  - 1 x daily - 7 x weekly - 2-3 min hold  - Levator Ani Self Trigger Point Release  - 1 x  daily - 7 x weekly - 1-3 min duration Access Code: [email or web address]  URL: [email or web address]  Date: 04/21/2025  Prepared by: Amanda Bernard    Exercises  - Supine Figure 4 Piriformis Stretch  - 1 x daily - 5 x weekly - 3 sets - 30 sec hold  - Hooklying Hamstring Stretch  - 1 x daily - 5 x weekly - 1 sets - 5-10 reps - 5 sec hold  - Hooklying Clamshell with Resistance  - 1 x daily - 3 x weekly - 2 sets - 15 reps - 5 sec hold  - Supine Bridge with Mini Swiss Ball Between Knees  - 1 x daily - 3 x weekly - 2 sets - 15 reps - 5 sec hold  - Sidelying Open Book Thoracic Lumbar Rotation and Extension  - 1 x daily - 5 x weekly - 1 sets - 10 reps - 5 sec hold  - Quadruped Full Range Thoracic Rotation with Reach  - 1 x daily - 5 x weekly - 1 sets - 10 reps - 5 sec hold  - Child's Pose Knees Apart and Hands Forward   - 1 x daily - 5 x weekly - 3 sets - 30 sec hold  - Doorway Pec Stretch at 90 Degrees Abduction  - 1 x daily - 5 x weekly - 3 sets - 30 sec hold  - Standing Quadratus Lumborum Stretch with Doorway  - 1 x daily - 5 x weekly - 3 sets - 30 sec hold  - Supine Butterfly Groin Stretch  - 1 x daily - 7 x weekly - 3 sets - 30 sec hold  - Supine Pelvic Floor Stretch  - 1 x daily - 7 x weekly - 3 sets - 30 sec hold  - Standing 'L' Stretch at Counter  - 1 x daily - 7 x weekly - 3 sets - 30 sec hold  - Kneeling Adductor Stretch with Hip External Rotation  - 1 x daily - 7 x weekly - 3 sets - 30 sec hold  - Yoga Squat for Pelvic Floor Relaxation  - 1 x daily - 7 x weekly - 2-3 min hold  - Levator Ani Self Trigger Point Release  - 1 x daily - 7 x weekly - 1-3 min duration  - Bent Knee Fallouts  - 1 x daily - 5 x weekly - 1-2 sets - 10 reps  - Hooklying Small March  - 1 x daily - 5 x weekly - 1-2 sets - 10 reps        HEP  Access Code: [email or web address]  URL: [email or web address]  Date: 04/21/2025  Prepared by: Amanda Bernard    Exercises  - Supine Figure 4 Piriformis Stretch  - 1 x daily - 5 x weekly - 3 sets - 30  sec hold  - Hooklying Hamstring Stretch  - 1 x daily - 5 x weekly - 1 sets - 5-10 reps - 5 sec hold  - Hooklying Clamshell with Resistance  - 1 x daily - 3 x weekly - 2 sets - 15 reps - 5 sec hold  - Supine Bridge with Mini Swiss Ball Between Knees  - 1 x daily - 3 x weekly - 2 sets - 15 reps - 5 sec hold  - Sidelying Open Book Thoracic Lumbar Rotation and Extension  - 1 x daily - 5 x weekly - 1 sets - 10 reps - 5 sec hold  - Quadruped Full Range Thoracic Rotation with Reach  - 1 x daily - 5 x weekly - 1 sets - 10 reps - 5 sec hold  - Child's Pose Knees Apart and Hands Forward   - 1 x daily - 5 x weekly - 3 sets - 30 sec hold  - Doorway Pec Stretch at 90 Degrees Abduction  - 1 x daily - 5 x weekly - 3 sets - 30 sec hold  - Standing Quadratus Lumborum Stretch with Doorway  - 1 x daily - 5 x weekly - 3 sets - 30 sec hold  - Supine Butterfly Groin Stretch  - 1 x daily - 7 x weekly - 3 sets - 30 sec hold  - Supine Pelvic Floor Stretch  - 1 x daily - 7 x weekly - 3 sets - 30 sec hold  - Standing 'L' Stretch at Counter  - 1 x daily - 7 x weekly - 3 sets - 30 sec hold  - Kneeling Adductor Stretch with Hip External Rotation  - 1 x daily - 7 x weekly - 3 sets - 30 sec hold  - Yoga Squat for Pelvic Floor Relaxation  - 1 x daily - 7 x weekly - 2-3 min hold  - Levator Ani Self Trigger Point Release  - 1 x daily - 7 x weekly - 1-3 min duration  - Bent Knee Fallouts  - 1 x daily - 5 x weekly - 1-2 sets - 10 reps  - Hooklying Small March  - 1 x daily - 5 x weekly - 1-2 sets - 10 reps    Charges  2NMR, 1TE

## 2025-04-26 ENCOUNTER — LAB ENCOUNTER (OUTPATIENT)
Dept: LAB | Age: 35
End: 2025-04-26
Attending: INTERNAL MEDICINE
Payer: COMMERCIAL

## 2025-04-26 DIAGNOSIS — R17 ELEVATED BILIRUBIN: ICD-10-CM

## 2025-04-26 LAB
ALBUMIN SERPL-MCNC: 4.4 G/DL (ref 3.2–4.8)
ALP LIVER SERPL-CCNC: 81 U/L (ref 37–98)
ALT SERPL-CCNC: 9 U/L (ref 10–49)
AST SERPL-CCNC: 13 U/L (ref ?–34)
BILIRUB DIRECT SERPL-MCNC: 0.4 MG/DL (ref ?–0.3)
BILIRUB SERPL-MCNC: 1.3 MG/DL (ref 0.3–1.2)
PROT SERPL-MCNC: 6.7 G/DL (ref 5.7–8.2)

## 2025-04-26 PROCEDURE — 80076 HEPATIC FUNCTION PANEL: CPT

## 2025-04-26 PROCEDURE — 36415 COLL VENOUS BLD VENIPUNCTURE: CPT

## 2025-04-28 ENCOUNTER — APPOINTMENT (OUTPATIENT)
Dept: PHYSICAL THERAPY | Age: 35
End: 2025-04-28
Attending: OBSTETRICS & GYNECOLOGY
Payer: COMMERCIAL

## 2025-05-05 ENCOUNTER — APPOINTMENT (OUTPATIENT)
Dept: PHYSICAL THERAPY | Age: 35
End: 2025-05-05
Attending: OBSTETRICS & GYNECOLOGY
Payer: COMMERCIAL

## 2025-05-09 ENCOUNTER — HOSPITAL ENCOUNTER (OUTPATIENT)
Age: 35
Discharge: ANOTHER HOSPITAL | End: 2025-05-09
Payer: COMMERCIAL

## 2025-05-09 ENCOUNTER — HOSPITAL ENCOUNTER (EMERGENCY)
Facility: HOSPITAL | Age: 35
Discharge: HOME | End: 2025-05-09
Attending: EMERGENCY MEDICINE
Payer: COMMERCIAL

## 2025-05-09 ENCOUNTER — APPOINTMENT (OUTPATIENT)
Dept: ULTRASOUND IMAGING | Facility: HOSPITAL | Age: 35
End: 2025-05-09
Attending: EMERGENCY MEDICINE
Payer: COMMERCIAL

## 2025-05-09 VITALS
TEMPERATURE: 98 F | RESPIRATION RATE: 16 BRPM | HEART RATE: 86 BPM | HEIGHT: 64 IN | SYSTOLIC BLOOD PRESSURE: 101 MMHG | BODY MASS INDEX: 23.22 KG/M2 | DIASTOLIC BLOOD PRESSURE: 84 MMHG | WEIGHT: 136 LBS | OXYGEN SATURATION: 100 %

## 2025-05-09 VITALS
WEIGHT: 136 LBS | HEIGHT: 64 IN | TEMPERATURE: 99 F | BODY MASS INDEX: 23.22 KG/M2 | RESPIRATION RATE: 17 BRPM | DIASTOLIC BLOOD PRESSURE: 69 MMHG | OXYGEN SATURATION: 100 % | SYSTOLIC BLOOD PRESSURE: 113 MMHG | HEART RATE: 84 BPM

## 2025-05-09 DIAGNOSIS — O36.80X0 PREGNANCY OF UNKNOWN ANATOMIC LOCATION (HCC): Primary | ICD-10-CM

## 2025-05-09 DIAGNOSIS — O46.90 VAGINAL BLEEDING IN PREGNANCY (HCC): Primary | ICD-10-CM

## 2025-05-09 DIAGNOSIS — R10.9 ABDOMINAL PAIN AFFECTING PREGNANCY (HCC): ICD-10-CM

## 2025-05-09 DIAGNOSIS — O26.899 ABDOMINAL PAIN AFFECTING PREGNANCY (HCC): ICD-10-CM

## 2025-05-09 LAB
ALBUMIN SERPL-MCNC: 4.8 G/DL (ref 3.2–4.8)
ALBUMIN/GLOB SERPL: 1.8 {RATIO} (ref 1–2)
ALP LIVER SERPL-CCNC: 75 U/L (ref 37–98)
ALT SERPL-CCNC: 9 U/L (ref 10–49)
ANION GAP SERPL CALC-SCNC: 12 MMOL/L (ref 0–18)
APTT PPP: 28.5 SECONDS (ref 23–36)
AST SERPL-CCNC: 12 U/L (ref ?–34)
B-HCG SERPL-ACNC: 5712.7 MIU/ML (ref ?–4.2)
BASOPHILS # BLD AUTO: 0.04 X10(3) UL (ref 0–0.2)
BASOPHILS NFR BLD AUTO: 0.8 %
BILIRUB SERPL-MCNC: 1.5 MG/DL (ref 0.3–1.2)
BILIRUB UR QL STRIP.AUTO: NEGATIVE
BUN BLD-MCNC: 14 MG/DL (ref 9–23)
CALCIUM BLD-MCNC: 9.1 MG/DL (ref 8.7–10.6)
CHLORIDE SERPL-SCNC: 105 MMOL/L (ref 98–112)
CLARITY UR REFRACT.AUTO: CLEAR
CO2 SERPL-SCNC: 22 MMOL/L (ref 21–32)
COLOR UR AUTO: YELLOW
CREAT BLD-MCNC: 0.61 MG/DL (ref 0.55–1.02)
EGFRCR SERPLBLD CKD-EPI 2021: 121 ML/MIN/1.73M2 (ref 60–?)
EOSINOPHIL # BLD AUTO: 0.01 X10(3) UL (ref 0–0.7)
EOSINOPHIL NFR BLD AUTO: 0.2 %
ERYTHROCYTE [DISTWIDTH] IN BLOOD BY AUTOMATED COUNT: 12.9 %
GLOBULIN PLAS-MCNC: 2.6 G/DL (ref 2–3.5)
GLUCOSE BLD-MCNC: 97 MG/DL (ref 70–99)
GLUCOSE UR STRIP.AUTO-MCNC: NORMAL MG/DL
HCT VFR BLD AUTO: 35.4 % (ref 35–48)
HGB BLD-MCNC: 12.4 G/DL (ref 12–16)
IMM GRANULOCYTES # BLD AUTO: 0.01 X10(3) UL (ref 0–1)
IMM GRANULOCYTES NFR BLD: 0.2 %
INR BLD: 1.03 (ref 0.8–1.2)
KETONES UR STRIP.AUTO-MCNC: 40 MG/DL
LEUKOCYTE ESTERASE UR QL STRIP.AUTO: 25
LYMPHOCYTES # BLD AUTO: 1.78 X10(3) UL (ref 1–4)
LYMPHOCYTES NFR BLD AUTO: 35.9 %
MCH RBC QN AUTO: 32 PG (ref 26–34)
MCHC RBC AUTO-ENTMCNC: 35 G/DL (ref 31–37)
MCV RBC AUTO: 91.2 FL (ref 80–100)
MONOCYTES # BLD AUTO: 0.25 X10(3) UL (ref 0.1–1)
MONOCYTES NFR BLD AUTO: 5 %
NEUTROPHILS # BLD AUTO: 2.87 X10 (3) UL (ref 1.5–7.7)
NEUTROPHILS # BLD AUTO: 2.87 X10(3) UL (ref 1.5–7.7)
NEUTROPHILS NFR BLD AUTO: 57.9 %
NITRITE UR QL STRIP.AUTO: NEGATIVE
OSMOLALITY SERPL CALC.SUM OF ELEC: 288 MOSM/KG (ref 275–295)
PH UR STRIP.AUTO: 6.5 [PH] (ref 5–8)
PLATELET # BLD AUTO: 270 10(3)UL (ref 150–450)
POTASSIUM SERPL-SCNC: 3.8 MMOL/L (ref 3.5–5.1)
PROT SERPL-MCNC: 7.4 G/DL (ref 5.7–8.2)
PROTHROMBIN TIME: 13.7 SECONDS (ref 11.6–14.8)
RBC # BLD AUTO: 3.88 X10(6)UL (ref 3.8–5.3)
RBC #/AREA URNS AUTO: >10 /HPF
SODIUM SERPL-SCNC: 139 MMOL/L (ref 136–145)
SP GR UR STRIP.AUTO: >1.03 (ref 1–1.03)
UROBILINOGEN UR STRIP.AUTO-MCNC: NORMAL MG/DL
WBC # BLD AUTO: 5 X10(3) UL (ref 4–11)

## 2025-05-09 PROCEDURE — 99212 OFFICE O/P EST SF 10 MIN: CPT

## 2025-05-09 PROCEDURE — 87086 URINE CULTURE/COLONY COUNT: CPT | Performed by: EMERGENCY MEDICINE

## 2025-05-09 PROCEDURE — 85025 COMPLETE CBC W/AUTO DIFF WBC: CPT | Performed by: EMERGENCY MEDICINE

## 2025-05-09 PROCEDURE — 81001 URINALYSIS AUTO W/SCOPE: CPT | Performed by: EMERGENCY MEDICINE

## 2025-05-09 PROCEDURE — 36415 COLL VENOUS BLD VENIPUNCTURE: CPT

## 2025-05-09 PROCEDURE — 99213 OFFICE O/P EST LOW 20 MIN: CPT

## 2025-05-09 PROCEDURE — 76801 OB US < 14 WKS SINGLE FETUS: CPT | Performed by: EMERGENCY MEDICINE

## 2025-05-09 PROCEDURE — 99284 EMERGENCY DEPT VISIT MOD MDM: CPT

## 2025-05-09 PROCEDURE — 85610 PROTHROMBIN TIME: CPT | Performed by: EMERGENCY MEDICINE

## 2025-05-09 PROCEDURE — 99285 EMERGENCY DEPT VISIT HI MDM: CPT

## 2025-05-09 PROCEDURE — 84702 CHORIONIC GONADOTROPIN TEST: CPT | Performed by: EMERGENCY MEDICINE

## 2025-05-09 PROCEDURE — 80053 COMPREHEN METABOLIC PANEL: CPT | Performed by: EMERGENCY MEDICINE

## 2025-05-09 PROCEDURE — 85730 THROMBOPLASTIN TIME PARTIAL: CPT | Performed by: EMERGENCY MEDICINE

## 2025-05-09 PROCEDURE — 76817 TRANSVAGINAL US OBSTETRIC: CPT | Performed by: EMERGENCY MEDICINE

## 2025-05-09 NOTE — DISCHARGE INSTRUCTIONS
As we discussed, it is not clear today whether or not this pregnancy was successful or whether or not you have an ectopic pregnancy.    It is important that you follow-up with the obstetrician for repeat blood work and potentially repeat imaging.

## 2025-05-09 NOTE — ED PROVIDER NOTES
Patient Seen in: [facility]      History   No chief complaint on file.    Stated Complaint: cramping pressure in back eval g    Subjective:   HPI  34-year-old G2, P1 female presents with acute onset lower abdominal cramping and vaginal spotting at approximately 7 weeks gestation.  History of Present Illness               Objective:     Past Medical History:    Allergic rhinitis    Bloating    Decorative tattoo    Flatulence/gas pain/belching    Food intolerance              History reviewed. No pertinent surgical history.             No pertinent social history.            Review of Systems   All other systems reviewed and are negative.      Positive for stated complaint: cramping pressure in back eval g  Other systems are as noted in HPI.  Constitutional and vital signs reviewed.      All other systems reviewed and negative except as noted above.                  Physical Exam     ED Triage Vitals [05/09/25 1016]   /84   Pulse 86   Resp 16   Temp 98.3 °F (36.8 °C)   Temp src Oral   SpO2 100 %   O2 Device None (Room air)       Current Vitals:   Vital Signs  BP: 101/84  Pulse: 86  Resp: 16  Temp: 98.3 °F (36.8 °C)  Temp src: Oral    Oxygen Therapy  SpO2: 100 %  O2 Device: None (Room air)        Physical Exam  Vitals and nursing note reviewed.   Constitutional:       General: She is not in acute distress.     Appearance: Normal appearance. She is normal weight. She is not ill-appearing, toxic-appearing or diaphoretic.   HENT:      Head: Normocephalic and atraumatic.      Right Ear: External ear normal.      Left Ear: External ear normal.      Nose: Nose normal.   Eyes:      Extraocular Movements: Extraocular movements intact.      Conjunctiva/sclera: Conjunctivae normal.      Pupils: Pupils are equal, round, and reactive to light.   Pulmonary:      Effort: Pulmonary effort is normal. No respiratory distress.   Musculoskeletal:         General: No swelling, tenderness, deformity or signs of  injury. Normal range of motion.      Cervical back: Normal range of motion and neck supple.   Skin:     General: Skin is warm and dry.      Capillary Refill: Capillary refill takes less than 2 seconds.      Coloration: Skin is not jaundiced or pale.      Findings: No bruising or erythema.   Neurological:      General: No focal deficit present.      Mental Status: She is alert and oriented to person, place, and time. Mental status is at baseline.      Gait: Gait normal.   Psychiatric:         Mood and Affect: Mood normal.         Behavior: Behavior normal.           Physical Exam                ED Course   Labs Reviewed - No data to display       Results                           MDM             Medical Decision Making  34-year-old G2, P1 female at approximately 7 weeks gestation presents with acute onset of lower abdominal cramping and vaginal spotting approximately 7 weeks gestation  Plan   - Will send to [facility] for advancement of care  - Patient will travel with spouse via personal vehicle        Disposition and Plan     Clinical Impression:  1. Vaginal bleeding in pregnancy (HCC)         Disposition:  Ic to ed  5/9/2025 10:36 am    Follow-up:  No follow-up provider specified.        Medications Prescribed:  Discharge Medication List as of 5/9/2025 10:43 AM          Supplementary Documentation:

## 2025-05-09 NOTE — ED INITIAL ASSESSMENT (HPI)
PT states that she is seven weeks pregnant and she noticed bleeding since three days ago, has been getting more intense (\"reddish\") on her pads, PT states total of three pads used since yesterday, intermittent  bleeding, adds cramping present as well.

## 2025-05-09 NOTE — ED INITIAL ASSESSMENT (HPI)
Pt states she is 7 weeks pregnant, vaginal bleeding since yesterday.    Cramping to abd/back since yesterday.    Denies n/v.

## 2025-05-09 NOTE — ED PROVIDER NOTES
Patient Seen in: [facility] Emergency Department      History     Chief Complaint   Patient presents with    Pregnancy Issues     Stated Complaint: 7 weeks pre, abd pain and bleeding    Subjective:   HPI    Breast-feeding patient here with LMP 7 weeks ago concerned about abdominal pain and bleeding.  Cramping in lower back across abdomen.  With spotting initially but then today when she wiped she had rather  Discharge.      No chest pain or shortness of breath no fevers or chills  History of Present Illness               Objective:     Past Medical History:    Allergic rhinitis    Bloating    Decorative tattoo    Flatulence/gas pain/belching    Food intolerance              History reviewed. No pertinent surgical history.             Social History     Socioeconomic History    Marital status:    Tobacco Use    Smoking status: Never     Passive exposure: Never    Smokeless tobacco: Never   Substance and Sexual Activity    Alcohol use: Yes     Comment: Socially    Drug use: Never    Sexual activity: Yes     Partners: Male   Other Topics Concern    Caffeine Concern No    Exercise No    Seat Belt No    Special Diet No    Stress Concern No    Weight Concern No     Social Drivers of Health     Food Insecurity: No Food Insecurity (12/25/2023)    Food Insecurity     Food Insecurity: Never true   Transportation Needs: No Transportation Needs (12/25/2023)    Transportation Needs     Lack of Transportation: No   Stress: No Stress Concern Present (12/25/2023)    Stress     Feeling of Stress : No   Housing Stability: Low Risk  (12/25/2023)    Housing Stability     Housing Instability: No                                Physical Exam     ED Triage Vitals   BP 05/09/25 1115 (!) 111/100   Pulse 05/09/25 1115 88   Resp 05/09/25 1245 17   Temp 05/09/25 1104 98.7 °F (37.1 °C)   Temp src 05/09/25 1104 Temporal   SpO2 05/09/25 1115 100 %   O2 Device 05/09/25 1115 None (Room air)       Current Vitals:   Vital Signs  BP:  100/72  Pulse: 90  Resp: 17  Temp: 98.7 °F (37.1 °C)  Temp src: Temporal  MAP (mmHg): 81    Oxygen Therapy  SpO2: 100 %  O2 Device: None (Room air)        Physical Exam  Physical Exam   Constitutional: Awake, alert, well appearing  Head: Normocephalic and atraumatic.   Eyes: Conjunctivae are normal. Pupils are equal, round, and reactive to light.   Neck: Normal range of motion. No JVD  Cardiovascular: Normal rate, regular rhythm  Pulmonary/Chest: Normal effort.  No accessory muscle use.  No cyanosis.  Abdominal: Soft. Not distended.  Neurological: Pt is alert and oriented to person, place, and time. no cranial nerve deficits. Speech fluent      Belly benign multiple exams  Physical Exam                ED Course     Labs Reviewed   COMP METABOLIC PANEL (14) - Abnormal; Notable for the following components:       Result Value    ALT 9 (*)     Bilirubin, Total 1.5 (*)     All other components within normal limits   URINALYSIS WITH CULTURE REFLEX - Abnormal; Notable for the following components:    Spec Gravity >1.030 (*)     Ketones Urine 40 (*)     Blood Urine 3+ (*)     Protein Urine Trace (*)     Leukocyte Esterase Urine 25 (*)     RBC Urine >10 (*)     Squamous Epi. Cells Few (*)     All other components within normal limits   HCG, BETA SUBUNIT (QUANT PREGNANCY TEST) - Abnormal; Notable for the following components:    Hcg Quantitative 5,712.7 (*)     All other components within normal limits   PROTHROMBIN TIME (PT) - Normal   PTT, ACTIVATED - Normal   CBC WITH DIFFERENTIAL WITH PLATELET   URINE CULTURE, ROUTINE          Results            Rh+, hCG 5700    US PREG 1ST TRIM W/EV (CPT=76801/91483)  Result Date: 5/9/2025  CONCLUSION:  1. Intrauterine gestational sac size corresponds to gestational age of 5 weeks 4 days, without yolk sac or fetal pole.  Findings may represent nonviable gestation, early intrauterine gestation and ectopic gestation are not entirely excluded.  Correlation  with serial HCG and follow-up  sonography as clinically directed is recommended. 2. Small uterine fibroid. 3. Left pelvic varices.   LOCATION:  [facility]   Dictated by (CST): Kade Devine MD on 5/09/2025 at 1:46 PM     Finalized by (CST): Kade Devine MD on 5/09/2025 at 1:53 PM       '  '                 MDM          Differential diagnoses considered: Surgical emergency such as ruptured ectopic pregnancy, early IUP,, missed AB, nonviable pregnancy      -Patient's abdomen totally benign multiple exams.    -Discussed with Dr. Cruz, on-call for Dr. Hunt.  This was a desired pregnancy.  As such recommends 48-hour follow-up.  Explained plan to patient.  Explained that we have not rule out ectopic pregnancy.  Explained reasons to return including worsening pain or bleeding.  She understood.    -48-hour beta-hCG ordered      I visualized the radiology studies, my independent interpretation: Definitive IUP not seen    *Discussion of ongoing management of this patient's care included: Dr. Cruz, OB    Shared decision making was done by: patient, myself.          Medical Decision Making      Disposition and Plan     Clinical Impression:  1. Pregnancy of unknown anatomic location (HCC)    2. Abdominal pain affecting pregnancy (HCC)         Disposition:  Discharge  5/9/2025  2:01 pm    Follow-up:  Ruby Cruz MD  [address] [facility] [facility] [address]  [place] [address]  [phone]    Call today      Jass Hunt MD  [address] [facility] [address]  [place] [address]  [phone]    Follow up            Medications Prescribed:  Current Discharge Medication List          Supplementary Documentation:

## 2025-05-10 ENCOUNTER — LAB ENCOUNTER (OUTPATIENT)
Dept: LAB | Age: 35
End: 2025-05-10
Attending: EMERGENCY MEDICINE
Payer: COMMERCIAL

## 2025-05-10 DIAGNOSIS — R17 ELEVATED BILIRUBIN: ICD-10-CM

## 2025-05-10 LAB
ALBUMIN SERPL-MCNC: 4.9 G/DL (ref 3.2–4.8)
ALP LIVER SERPL-CCNC: 73 U/L (ref 37–98)
ALT SERPL-CCNC: 10 U/L (ref 10–49)
AST SERPL-CCNC: 14 U/L (ref ?–34)
BILIRUB DIRECT SERPL-MCNC: 0.4 MG/DL (ref ?–0.3)
BILIRUB SERPL-MCNC: 1.4 MG/DL (ref 0.3–1.2)
PROT SERPL-MCNC: 7.5 G/DL (ref 5.7–8.2)

## 2025-05-10 PROCEDURE — 36415 COLL VENOUS BLD VENIPUNCTURE: CPT

## 2025-05-10 PROCEDURE — 80076 HEPATIC FUNCTION PANEL: CPT

## 2025-05-11 ENCOUNTER — LAB ENCOUNTER (OUTPATIENT)
Dept: LAB | Facility: HOSPITAL | Age: 35
End: 2025-05-11
Attending: INTERNAL MEDICINE
Payer: COMMERCIAL

## 2025-05-11 DIAGNOSIS — O36.80X0 PREGNANCY OF UNKNOWN ANATOMIC LOCATION (HCC): ICD-10-CM

## 2025-05-11 LAB — B-HCG SERPL-ACNC: 5029.8 MIU/ML (ref ?–4.2)

## 2025-05-11 PROCEDURE — 36415 COLL VENOUS BLD VENIPUNCTURE: CPT

## 2025-05-11 PROCEDURE — 84702 CHORIONIC GONADOTROPIN TEST: CPT

## 2025-05-12 ENCOUNTER — TELEPHONE (OUTPATIENT)
Facility: CLINIC | Age: 35
End: 2025-05-12

## 2025-05-12 ENCOUNTER — PATIENT MESSAGE (OUTPATIENT)
Dept: OBGYN CLINIC | Facility: CLINIC | Age: 35
End: 2025-05-12

## 2025-05-12 ENCOUNTER — OFFICE VISIT (OUTPATIENT)
Dept: OBGYN CLINIC | Facility: CLINIC | Age: 35
End: 2025-05-12
Payer: COMMERCIAL

## 2025-05-12 VITALS
SYSTOLIC BLOOD PRESSURE: 104 MMHG | DIASTOLIC BLOOD PRESSURE: 68 MMHG | HEART RATE: 89 BPM | BODY MASS INDEX: 22.99 KG/M2 | HEIGHT: 64 IN | WEIGHT: 134.63 LBS

## 2025-05-12 DIAGNOSIS — O20.0 THREATENED MISCARRIAGE (HCC): Primary | ICD-10-CM

## 2025-05-12 DIAGNOSIS — O03.9 SAB (SPONTANEOUS ABORTION) (HCC): Primary | ICD-10-CM

## 2025-05-12 PROCEDURE — 3074F SYST BP LT 130 MM HG: CPT | Performed by: STUDENT IN AN ORGANIZED HEALTH CARE EDUCATION/TRAINING PROGRAM

## 2025-05-12 PROCEDURE — 99214 OFFICE O/P EST MOD 30 MIN: CPT | Performed by: STUDENT IN AN ORGANIZED HEALTH CARE EDUCATION/TRAINING PROGRAM

## 2025-05-12 PROCEDURE — 3008F BODY MASS INDEX DOCD: CPT | Performed by: STUDENT IN AN ORGANIZED HEALTH CARE EDUCATION/TRAINING PROGRAM

## 2025-05-12 PROCEDURE — 3078F DIAST BP <80 MM HG: CPT | Performed by: STUDENT IN AN ORGANIZED HEALTH CARE EDUCATION/TRAINING PROGRAM

## 2025-05-12 RX ORDER — MISOPROSTOL 200 UG/1
TABLET ORAL
Qty: 8 TABLET | Refills: 0 | Status: SHIPPED | OUTPATIENT
Start: 2025-05-12

## 2025-05-12 NOTE — TELEPHONE ENCOUNTER
7  LMP 3/19/25 ER follow up  cramping, spotting.    Does have regular period  She still breast feeding  O+    Still having occasional cramps, pinkish brownish discharge spotting. No n/v.     48h hcg completed awaiting for provider review.     Follow up appointment today with Dr. Andrade at [address] location. Patient verbalized understanding, agreed to and intend to comply with plan of care.

## 2025-05-12 NOTE — TELEPHONE ENCOUNTER
----- Message from Ruby Cruz sent at 5/9/2025  1:53 PM CDT -----  Regarding: ED patient (previously [facility] patient) - needs 48h repeat HCG and follow up  Pt with early pregnancy, ultrasound shows something that looks like a gestational sac but technically can't confirm it is a pregnancy in the uterus, there is the possibility of an ectopic pregnancy as wellPt is stable and comfortable so recommended repeat HCG in 48h and follow up in clinicSince Dr Hunt is not doing OB (and he's out of town right now which is why I'm covering) I think she would follow up with us?I ordered the HCG but it will go to the \"departed provider pool\" since this is my last day :(So giving you guys a heads up that someone will need to follow her results!

## 2025-05-12 NOTE — PROGRESS NOTES
[facility]  Obstetrics and Gynecology  History & Physical    CC: Establish prenatal care     Subjective:     HPI:  mUu Stuart is a 34 year old  female at 6 weeks 0 days by 5 week 4 day US with gestational sac, no fetal pole.  By LMP 2025.      Presented to ED with positive pregnancy test and BRB on Friday.  Today it is pinkish, brownish while wiping.  She is breastfeeding.      Menses has been more or less regular.   Would be 7 weeks.      Objective:   /68   Pulse 89   Ht 64\"   Wt 134 lb 9.6 oz (61.1 kg)   LMP 2025 (Exact Date)   Breastfeeding Yes   BMI 23.10 kg/m²   Estimated body mass index is 23.1 kg/m² as calculated from the following:    Height as of this encounter: 64\".    Weight as of this encounter: 134 lb 9.6 oz (61.1 kg).    PE:  General: normal appearance  HEENT: normocephalic  Breast: normal contour  Respiratory: normal work of breathing, no extra use of accessory muscles  Cardiac: normal rate  Abdominal: Nontender to palpation, no masses  MSK: normal range of motion  Neuro: normal movement, normal sensory  Skin: no abnormalities seen    :  - normal appearing external genitalia  - normal appearing vagina with 20 mL blood in the vaginal vault  - external os is open to 1 cm.  Internal os closed    US PREG 1ST TRIM W/EV (CPT=76801/18122)  Result Date: 2025  CONCLUSION:  1. Intrauterine gestational sac size corresponds to gestational age of 5 weeks 4 days, without yolk sac or fetal pole.  Findings may represent nonviable gestation, early intrauterine gestation and ectopic gestation are not entirely excluded.  Correlation  with serial HCG and follow-up sonography as clinically directed is recommended. 2. Small uterine fibroid. 3. Left pelvic varices.   LOCATION:  [place]   Dictated by (CST): Kade Devine MD on 2025 at 1:46 PM     Finalized by (CST): Kade Devine MD on 2025 at 1:53 PM           Assessment/Plan:     Umu Stuart is a 34 year  old  female at 6 weeks 0 days by 5 week 4 day US with miscarriage.   Gestational sac (1.1 cm), no fetal pole.  By LMP she is supposed to be >7 weeks.  External os open and actively bleeding.  Stable. Rh positive      beta HCG 5712   Beta HCG 5029    Plan repeat beta HCG tomorrow for patient's peace of mind  Plan cytotec management pending beta HCG.  If decrease or the same, has miscarriage.  Declines expectant and surgical management         Miya Andrade MD   [facility] - OBGYN

## 2025-05-13 ENCOUNTER — HOSPITAL ENCOUNTER (OUTPATIENT)
Dept: ULTRASOUND IMAGING | Age: 35
Discharge: HOME | End: 2025-05-13
Attending: INTERNAL MEDICINE
Payer: COMMERCIAL

## 2025-05-13 ENCOUNTER — LAB ENCOUNTER (OUTPATIENT)
Dept: LAB | Age: 35
End: 2025-05-13
Attending: STUDENT IN AN ORGANIZED HEALTH CARE EDUCATION/TRAINING PROGRAM
Payer: COMMERCIAL

## 2025-05-13 DIAGNOSIS — O20.0 THREATENED MISCARRIAGE (HCC): ICD-10-CM

## 2025-05-13 DIAGNOSIS — R17 ELEVATED BILIRUBIN: ICD-10-CM

## 2025-05-13 LAB — B-HCG SERPL-ACNC: 2203.3 MIU/ML (ref ?–4.2)

## 2025-05-13 PROCEDURE — 76700 US EXAM ABDOM COMPLETE: CPT | Performed by: INTERNAL MEDICINE

## 2025-05-13 PROCEDURE — 36415 COLL VENOUS BLD VENIPUNCTURE: CPT

## 2025-05-13 PROCEDURE — 84702 CHORIONIC GONADOTROPIN TEST: CPT

## 2025-05-14 ENCOUNTER — APPOINTMENT (OUTPATIENT)
Dept: PHYSICAL THERAPY | Age: 35
End: 2025-05-14
Attending: OBSTETRICS & GYNECOLOGY
Payer: COMMERCIAL

## 2025-05-19 ENCOUNTER — TELEPHONE (OUTPATIENT)
Dept: OBGYN UNIT | Facility: HOSPITAL | Age: 35
End: 2025-05-19

## 2025-05-20 ENCOUNTER — LAB ENCOUNTER (OUTPATIENT)
Dept: LAB | Age: 35
End: 2025-05-20
Attending: EMERGENCY MEDICINE
Payer: COMMERCIAL

## 2025-05-20 DIAGNOSIS — O36.80X0 PREGNANCY OF UNKNOWN ANATOMIC LOCATION (HCC): ICD-10-CM

## 2025-05-20 LAB — B-HCG SERPL-ACNC: 51 MIU/ML (ref ?–4.2)

## 2025-05-20 PROCEDURE — 84702 CHORIONIC GONADOTROPIN TEST: CPT

## 2025-05-20 PROCEDURE — 36415 COLL VENOUS BLD VENIPUNCTURE: CPT

## 2025-05-27 ENCOUNTER — LAB ENCOUNTER (OUTPATIENT)
Dept: LAB | Age: 35
End: 2025-05-27
Attending: OBSTETRICS & GYNECOLOGY
Payer: COMMERCIAL

## 2025-05-27 DIAGNOSIS — O03.9 MISCARRIAGE (HCC): ICD-10-CM

## 2025-05-27 LAB — B-HCG SERPL-ACNC: 6.6 MIU/ML (ref ?–4.2)

## 2025-05-27 PROCEDURE — 36415 COLL VENOUS BLD VENIPUNCTURE: CPT

## 2025-05-27 PROCEDURE — 84702 CHORIONIC GONADOTROPIN TEST: CPT

## 2025-06-03 ENCOUNTER — ULTRASOUND ENCOUNTER (OUTPATIENT)
Facility: CLINIC | Age: 35
End: 2025-06-03
Payer: COMMERCIAL

## 2025-06-23 ENCOUNTER — TELEPHONE (OUTPATIENT)
Facility: CLINIC | Age: 35
End: 2025-06-23

## 2025-06-24 ENCOUNTER — OFFICE VISIT (OUTPATIENT)
Facility: CLINIC | Age: 35
End: 2025-06-24
Payer: COMMERCIAL

## 2025-06-24 VITALS
DIASTOLIC BLOOD PRESSURE: 72 MMHG | HEIGHT: 64 IN | SYSTOLIC BLOOD PRESSURE: 100 MMHG | BODY MASS INDEX: 23.56 KG/M2 | WEIGHT: 138 LBS

## 2025-06-24 DIAGNOSIS — O03.9 SAB (SPONTANEOUS ABORTION) (HCC): Primary | ICD-10-CM

## 2025-06-24 PROCEDURE — 3074F SYST BP LT 130 MM HG: CPT | Performed by: OBSTETRICS & GYNECOLOGY

## 2025-06-24 PROCEDURE — 3008F BODY MASS INDEX DOCD: CPT | Performed by: OBSTETRICS & GYNECOLOGY

## 2025-06-24 PROCEDURE — 3078F DIAST BP <80 MM HG: CPT | Performed by: OBSTETRICS & GYNECOLOGY

## 2025-06-24 PROCEDURE — 99212 OFFICE O/P EST SF 10 MIN: CPT | Performed by: OBSTETRICS & GYNECOLOGY

## 2025-06-24 NOTE — PROGRESS NOTES
Gynecology Office Visit    The patient was offered a medical chaperone during the visit.    Umu Stuart is a 35 year old female  Patient's last menstrual period was 2025 (exact date). (contraception:  trying to conceive)     HPI:     Chief Complaint   Patient presents with    Consult     Pt had a miscarriage in May 2025. Pt states she is pre-diabetic and has low iron.     SAB May 2025 at 6 weeks. Week 5 day 4 US showed gestational sac, no fetal pole.    Patient reports that she would like to conceive and wants to make sure her low iron and pre-diabetic test results wont affect her chances of getting pregnant.     She states she is weaning off of breast feeding her current child this summer.     Ken HERNÁNDEZ      Chart and previous encounters reviewed.  HISTORY:  Past Medical History[1]   Past Surgical History[2]   Family History[3]   Social History: Short Social Hx on File[4]     Medications (Active prior to today's visit):  Current Medications[5]    Allergies:  Allergies[6]    Gyn:  Menarche: 14  Period Cycle (Days): 29-30  Period Duration (Days): 5-6  Period Flow: heavy days 1-3  Use of Birth Control (if yes, specify type): None  Pap Date: 25  Pap Result Notes: neg/neg ;  2022 NEG/NEG, 2019 and neg per Pt.  Follow Up Recommendation:     OB Hx:  OB History    Para Term  AB Living   2 1 1 0 1 1   SAB IAB Ectopic Multiple Live Births   1 0 0 0 1      # Outcome Date GA Lbr Jm/2nd Weight Sex Type Anes PTL Lv   2 SAB 25 5w4d    SAB         Birth Comments: Anembryonic pregnancy. Empty gestational sac 5w4d. HCG 5712 at the time of the ultrasound measuring 5w4d. HCG fell   1 Term 23 39w1d 18:15 / 01:35 9 lb 0.3 oz (4.09 kg) M NORMAL SPONT EPI N BEA      Complications: Meconium         ROS:     10 point ROS completed and was negative, except for pertinent positive and negatives stated in the HPI.    PHYSICAL EXAM:   /72   Ht 64\"   Wt 138 lb (62.6 kg)    LMP 2025 (Exact Date)   Breastfeeding Yes   BMI 23.69 kg/m²      Wt Readings from Last 6 Encounters:   25 138 lb (62.6 kg)   25 134 lb 9.6 oz (61.1 kg)   25 136 lb (61.7 kg)   25 136 lb (61.7 kg)   25 136 lb (61.7 kg)   25 133 lb (60.3 kg)        Gen:  Oriented, in no acute distress         ASSESSMENT/PLAN:     1. SAB (spontaneous ) ([facility])    2. Lactating mother ([facility])      Discussed attempting to conceive as soon as possible. Discussed once she becomes pregnant she is able to follow up here with  to confirm intrauterine pregnancy, then will be referred out due to  no longer delivering children.       Wean off lactation  Time intercourse  No need for letrozole - conceived on a natural cycle.         Meds This Visit:  Requested Prescriptions      No prescriptions requested or ordered in this encounter       Imaging & Referrals:  None     Return in about 8 months (around 2026) for Well Woman Exam.      Jass Hunt MD  2025  1:55 PM         This note was created by [employer] voice recognition. Errors in content may be related to improper recognition by the system; efforts to review and correct have been done but errors may still exist. Please contact me with any questions.    Note to patient and family   The  [employer] Act makes medical notes available to patients in the interest of transparency.  However, please be advised that this is a medical document.  It is intended as [id number] communication.  It is written and medical language may contain abbreviations or verbiage that are technical and unfamiliar.  It may appear blunt or direct.  Medical documents are intended to carry relevant information, facts as evident, and the clinical opinion of the practitioner.           [1]   Past Medical History:   Allergic rhinitis    Bloating    Decorative tattoo    Fibroids    Flatulence/gas pain/belching    Food intolerance     Gastroesophageal reflux disease    History of delivery of macrosomal infant    Hx of blepharoplasty    Iron deficiency anemia, unspecified   [2]   Past Surgical History:  Procedure Laterality Date    Other surgical history      blepharoplasty   [3]   Family History  Problem Relation Age of Onset    Diabetes Maternal Grandmother     Diabetes Maternal Grandfather     Diabetes Paternal Grandmother     Diabetes Paternal Grandfather     Diabetes Mother         Pre-diabetic   [4]   Social History  Socioeconomic History    Marital status:    Tobacco Use    Smoking status: Never     Passive exposure: Never    Smokeless tobacco: Never   Substance and Sexual Activity    Alcohol use: Not Currently     Comment: Socially    Drug use: Never    Sexual activity: Yes     Partners: Male   Other Topics Concern    Caffeine Concern No    Exercise No    Seat Belt No    Special Diet No    Stress Concern No    Weight Concern No     Social Drivers of Health     Food Insecurity: No Food Insecurity (12/25/2023)    Food Insecurity     Food Insecurity: Never true   Transportation Needs: No Transportation Needs (12/25/2023)    Transportation Needs     Lack of Transportation: No   Stress: No Stress Concern Present (12/25/2023)    Stress     Feeling of Stress : No   Housing Stability: Low Risk  (12/25/2023)    Housing Stability     Housing Instability: No   [5]   Current Outpatient Medications   Medication Sig Dispense Refill    miSOPROStol 200 MCG Oral Tab Please place 4 pills in your cheeks for 30 minutes and then swallow.  If you have not passed your miscarriage within 24 hours, please take the 2nd set of pills in the same way. 8 tablet 0    letrozole 2.5 MG Oral Tab Take 1 tablet (2.5 mg total) by mouth daily. Days 3 - 7 of every other month (Patient not taking: Reported on 5/9/2025) 15 tablet 1    prenatal vitamin with DHA 27-0.8-228 MG Oral Cap Take 1 capsule by mouth in the morning.     [6] No Known Allergies

## 2025-07-11 ENCOUNTER — LAB ENCOUNTER (OUTPATIENT)
Dept: LAB | Age: 35
End: 2025-07-11
Attending: INTERNAL MEDICINE
Payer: COMMERCIAL

## 2025-07-11 DIAGNOSIS — R79.89 LOW VITAMIN D LEVEL: ICD-10-CM

## 2025-07-11 DIAGNOSIS — R17 HIGH BILIRUBIN: ICD-10-CM

## 2025-07-11 DIAGNOSIS — R73.03 PREDIABETES: ICD-10-CM

## 2025-07-11 LAB
ALBUMIN SERPL-MCNC: 4.5 G/DL (ref 3.2–4.8)
ALBUMIN/GLOB SERPL: 1.7 {RATIO} (ref 1–2)
ALP LIVER SERPL-CCNC: 74 U/L (ref 37–98)
ALT SERPL-CCNC: 11 U/L (ref 10–49)
ANION GAP SERPL CALC-SCNC: 4 MMOL/L (ref 0–18)
AST SERPL-CCNC: 14 U/L (ref ?–34)
BASOPHILS # BLD AUTO: 0.03 X10(3) UL (ref 0–0.2)
BASOPHILS NFR BLD AUTO: 0.6 %
BILIRUB SERPL-MCNC: 2 MG/DL (ref 0.3–1.2)
BUN BLD-MCNC: 11 MG/DL (ref 9–23)
CALCIUM BLD-MCNC: 8.9 MG/DL (ref 8.7–10.6)
CHLORIDE SERPL-SCNC: 107 MMOL/L (ref 98–112)
CO2 SERPL-SCNC: 31 MMOL/L (ref 21–32)
CREAT BLD-MCNC: 0.71 MG/DL (ref 0.55–1.02)
DEPRECATED HBV CORE AB SER IA-ACNC: 17 NG/ML (ref 50–306)
EGFRCR SERPLBLD CKD-EPI 2021: 114 ML/MIN/1.73M2 (ref 60–?)
EOSINOPHIL # BLD AUTO: 0.06 X10(3) UL (ref 0–0.7)
EOSINOPHIL NFR BLD AUTO: 1.3 %
ERYTHROCYTE [DISTWIDTH] IN BLOOD BY AUTOMATED COUNT: 13 %
EST. AVERAGE GLUCOSE BLD GHB EST-MCNC: 120 MG/DL (ref 68–126)
FASTING STATUS PATIENT QL REPORTED: YES
GLOBULIN PLAS-MCNC: 2.6 G/DL (ref 2–3.5)
GLUCOSE BLD-MCNC: 100 MG/DL (ref 70–99)
HBA1C MFR BLD: 5.8 % (ref ?–5.7)
HCT VFR BLD AUTO: 35.3 % (ref 35–48)
HGB BLD-MCNC: 11.9 G/DL (ref 12–16)
IMM GRANULOCYTES # BLD AUTO: 0.01 X10(3) UL (ref 0–1)
IMM GRANULOCYTES NFR BLD: 0.2 %
IRON SATN MFR SERPL: 32 % (ref 15–50)
IRON SERPL-MCNC: 139 UG/DL (ref 50–170)
LYMPHOCYTES # BLD AUTO: 2.03 X10(3) UL (ref 1–4)
LYMPHOCYTES NFR BLD AUTO: 42.6 %
MCH RBC QN AUTO: 31.8 PG (ref 26–34)
MCHC RBC AUTO-ENTMCNC: 33.7 G/DL (ref 31–37)
MCV RBC AUTO: 94.4 FL (ref 80–100)
MONOCYTES # BLD AUTO: 0.28 X10(3) UL (ref 0.1–1)
MONOCYTES NFR BLD AUTO: 5.9 %
NEUTROPHILS # BLD AUTO: 2.36 X10 (3) UL (ref 1.5–7.7)
NEUTROPHILS # BLD AUTO: 2.36 X10(3) UL (ref 1.5–7.7)
NEUTROPHILS NFR BLD AUTO: 49.4 %
OSMOLALITY SERPL CALC.SUM OF ELEC: 293 MOSM/KG (ref 275–295)
PLATELET # BLD AUTO: 267 10(3)UL (ref 150–450)
POTASSIUM SERPL-SCNC: 4 MMOL/L (ref 3.5–5.1)
PROT SERPL-MCNC: 7.1 G/DL (ref 5.7–8.2)
RBC # BLD AUTO: 3.74 X10(6)UL (ref 3.8–5.3)
SODIUM SERPL-SCNC: 142 MMOL/L (ref 136–145)
TOTAL IRON BINDING CAPACITY: 428 UG/DL (ref 250–425)
TRANSFERRIN SERPL-MCNC: 340 MG/DL (ref 250–380)
VIT D+METAB SERPL-MCNC: 31.3 NG/ML (ref 30–100)
WBC # BLD AUTO: 4.8 X10(3) UL (ref 4–11)

## 2025-07-11 PROCEDURE — 36415 COLL VENOUS BLD VENIPUNCTURE: CPT

## 2025-07-11 PROCEDURE — 85025 COMPLETE CBC W/AUTO DIFF WBC: CPT

## 2025-07-11 PROCEDURE — 83036 HEMOGLOBIN GLYCOSYLATED A1C: CPT

## 2025-07-11 PROCEDURE — 82306 VITAMIN D 25 HYDROXY: CPT

## 2025-07-11 PROCEDURE — 82728 ASSAY OF FERRITIN: CPT

## 2025-07-11 PROCEDURE — 80053 COMPREHEN METABOLIC PANEL: CPT

## 2025-07-11 PROCEDURE — 83540 ASSAY OF IRON: CPT

## 2025-07-11 PROCEDURE — 83550 IRON BINDING TEST: CPT

## 2025-07-19 ENCOUNTER — LAB ENCOUNTER (OUTPATIENT)
Dept: LAB | Age: 35
End: 2025-07-19
Attending: INTERNAL MEDICINE
Payer: COMMERCIAL

## 2025-07-19 DIAGNOSIS — Z01.89 PATIENT REQUEST FOR DIAGNOSTIC TESTING: ICD-10-CM

## 2025-07-19 DIAGNOSIS — R73.03 PREDIABETES: ICD-10-CM

## 2025-07-19 LAB — INSULIN SERPL-ACNC: 3.1 MU/L (ref 3–25)

## 2025-07-19 PROCEDURE — 83525 ASSAY OF INSULIN: CPT

## 2025-07-19 PROCEDURE — 36415 COLL VENOUS BLD VENIPUNCTURE: CPT

## 2025-07-21 ENCOUNTER — OFFICE VISIT (OUTPATIENT)
Dept: SURGERY | Facility: CLINIC | Age: 35
End: 2025-07-21

## 2025-07-21 ENCOUNTER — LAB ENCOUNTER (OUTPATIENT)
Dept: LAB | Facility: HOSPITAL | Age: 35
End: 2025-07-21
Attending: INTERNAL MEDICINE
Payer: COMMERCIAL

## 2025-07-21 ENCOUNTER — ORDER TRANSCRIPTION (OUTPATIENT)
Dept: ADMINISTRATIVE | Facility: HOSPITAL | Age: 35
End: 2025-07-21

## 2025-07-21 DIAGNOSIS — R17 SERUM TOTAL BILIRUBIN ELEVATED: ICD-10-CM

## 2025-07-21 DIAGNOSIS — R73.03 PREDIABETES: Primary | ICD-10-CM

## 2025-07-21 DIAGNOSIS — R17 SERUM TOTAL BILIRUBIN ELEVATED: Primary | ICD-10-CM

## 2025-07-21 LAB
ALBUMIN SERPL-MCNC: 4.8 G/DL (ref 3.2–4.8)
ALP LIVER SERPL-CCNC: 76 U/L (ref 37–98)
ALT SERPL-CCNC: 11 U/L (ref 10–49)
AST SERPL-CCNC: 15 U/L (ref ?–34)
BASOPHILS # BLD AUTO: 0.04 X10(3) UL (ref 0–0.2)
BASOPHILS NFR BLD AUTO: 0.8 %
BILIRUB DIRECT SERPL-MCNC: 0.4 MG/DL (ref ?–0.3)
BILIRUB SERPL-MCNC: 1.4 MG/DL (ref 0.3–1.2)
EOSINOPHIL # BLD AUTO: 0.04 X10(3) UL (ref 0–0.7)
EOSINOPHIL NFR BLD AUTO: 0.8 %
ERYTHROCYTE [DISTWIDTH] IN BLOOD BY AUTOMATED COUNT: 12.7 %
HAPTOGLOB SERPL-MCNC: 98 MG/DL (ref 30–200)
HCT VFR BLD AUTO: 35.8 % (ref 35–48)
HGB BLD-MCNC: 12.2 G/DL (ref 12–16)
IMM GRANULOCYTES # BLD AUTO: 0.01 X10(3) UL (ref 0–1)
IMM GRANULOCYTES NFR BLD: 0.2 %
LDH SERPL L TO P-CCNC: 153 U/L (ref 120–246)
LYMPHOCYTES # BLD AUTO: 2.06 X10(3) UL (ref 1–4)
LYMPHOCYTES NFR BLD AUTO: 41.3 %
MCH RBC QN AUTO: 31.2 PG (ref 26–34)
MCHC RBC AUTO-ENTMCNC: 34.1 G/DL (ref 31–37)
MCV RBC AUTO: 91.6 FL (ref 80–100)
MONOCYTES # BLD AUTO: 0.26 X10(3) UL (ref 0.1–1)
MONOCYTES NFR BLD AUTO: 5.2 %
NEUTROPHILS # BLD AUTO: 2.58 X10 (3) UL (ref 1.5–7.7)
NEUTROPHILS # BLD AUTO: 2.58 X10(3) UL (ref 1.5–7.7)
NEUTROPHILS NFR BLD AUTO: 51.7 %
PLATELET # BLD AUTO: 269 10(3)UL (ref 150–450)
PROT SERPL-MCNC: 7.4 G/DL (ref 5.7–8.2)
RBC # BLD AUTO: 3.91 X10(6)UL (ref 3.8–5.3)
WBC # BLD AUTO: 5 X10(3) UL (ref 4–11)

## 2025-07-21 PROCEDURE — 83615 LACTATE (LD) (LDH) ENZYME: CPT

## 2025-07-21 PROCEDURE — 36415 COLL VENOUS BLD VENIPUNCTURE: CPT

## 2025-07-21 PROCEDURE — 80076 HEPATIC FUNCTION PANEL: CPT

## 2025-07-21 PROCEDURE — 83010 ASSAY OF HAPTOGLOBIN QUANT: CPT

## 2025-07-21 PROCEDURE — 85025 COMPLETE CBC W/AUTO DIFF WBC: CPT

## 2025-07-28 ENCOUNTER — PATIENT MESSAGE (OUTPATIENT)
Dept: INTERNAL MEDICINE CLINIC | Facility: CLINIC | Age: 35
End: 2025-07-28

## 2025-07-28 DIAGNOSIS — R73.03 PREDIABETES: Primary | ICD-10-CM

## 2025-07-30 ENCOUNTER — LAB ENCOUNTER (OUTPATIENT)
Dept: LAB | Age: 35
End: 2025-07-30
Attending: INTERNAL MEDICINE

## 2025-07-30 DIAGNOSIS — R73.03 PREDIABETES: ICD-10-CM

## 2025-07-30 LAB — CORTIS SERPL-MCNC: 23.4 UG/DL

## 2025-07-30 PROCEDURE — 82533 TOTAL CORTISOL: CPT

## 2025-07-30 PROCEDURE — 36415 COLL VENOUS BLD VENIPUNCTURE: CPT

## 2025-08-02 ENCOUNTER — LAB ENCOUNTER (OUTPATIENT)
Dept: LAB | Age: 35
End: 2025-08-02
Attending: INTERNAL MEDICINE

## 2025-08-02 DIAGNOSIS — E28.8 OTHER OVARIAN DYSFUNCTION: ICD-10-CM

## 2025-08-02 LAB — PROGEST SERPL-MCNC: 20.92 NG/ML

## 2025-08-02 PROCEDURE — 84144 ASSAY OF PROGESTERONE: CPT

## 2025-08-02 PROCEDURE — 36415 COLL VENOUS BLD VENIPUNCTURE: CPT

## 2025-08-07 ENCOUNTER — OFFICE VISIT (OUTPATIENT)
Dept: NUTRITION | Facility: HOSPITAL | Age: 35
End: 2025-08-07
Attending: INTERNAL MEDICINE

## 2025-08-07 PROBLEM — R73.03 PREDIABETES: Status: ACTIVE | Noted: 2025-08-07

## 2025-08-07 PROCEDURE — 97802 MEDICAL NUTRITION INDIV IN: CPT

## 2025-08-11 ENCOUNTER — LAB ENCOUNTER (OUTPATIENT)
Dept: LAB | Age: 35
End: 2025-08-11
Attending: NURSE PRACTITIONER

## 2025-08-11 DIAGNOSIS — D50.9 IRON DEFICIENCY ANEMIA, UNSPECIFIED IRON DEFICIENCY ANEMIA TYPE: ICD-10-CM

## 2025-08-11 DIAGNOSIS — E53.8 VITAMIN B12 DEFICIENCY: ICD-10-CM

## 2025-08-11 LAB — HCG UR QL: NEGATIVE

## 2025-08-11 PROCEDURE — 86340 INTRINSIC FACTOR ANTIBODY: CPT

## 2025-08-11 PROCEDURE — 83516 IMMUNOASSAY NONANTIBODY: CPT

## 2025-08-11 PROCEDURE — 36415 COLL VENOUS BLD VENIPUNCTURE: CPT

## 2025-08-11 PROCEDURE — 81025 URINE PREGNANCY TEST: CPT

## 2025-08-12 LAB — PARIETAL CELL AB: 18.1 UNITS

## 2025-08-14 PROBLEM — R12 HEARTBURN: Status: ACTIVE | Noted: 2025-08-14

## 2025-08-14 PROBLEM — R10.13 ABDOMINAL PAIN, EPIGASTRIC: Status: ACTIVE | Noted: 2025-08-14

## 2025-08-14 LAB — INTRINSIC FACTOR ABS: 1.1 AU/ML
